# Patient Record
Sex: FEMALE | Race: AMERICAN INDIAN OR ALASKA NATIVE
[De-identification: names, ages, dates, MRNs, and addresses within clinical notes are randomized per-mention and may not be internally consistent; named-entity substitution may affect disease eponyms.]

---

## 2019-11-01 ENCOUNTER — HOSPITAL ENCOUNTER (EMERGENCY)
Dept: HOSPITAL 5 - ED | Age: 29
Discharge: HOME | End: 2019-11-01
Payer: COMMERCIAL

## 2019-11-01 VITALS — SYSTOLIC BLOOD PRESSURE: 133 MMHG | DIASTOLIC BLOOD PRESSURE: 63 MMHG

## 2019-11-01 DIAGNOSIS — K21.9: ICD-10-CM

## 2019-11-01 DIAGNOSIS — R00.2: ICD-10-CM

## 2019-11-01 DIAGNOSIS — B34.9: Primary | ICD-10-CM

## 2019-11-01 LAB
BASOPHILS # (AUTO): 0.1 K/MM3 (ref 0–0.1)
BASOPHILS NFR BLD AUTO: 1.2 % (ref 0–1.8)
BUN SERPL-MCNC: 9 MG/DL (ref 7–17)
BUN/CREAT SERPL: 15 %
CALCIUM SERPL-MCNC: 9.2 MG/DL (ref 8.4–10.2)
EOSINOPHIL # BLD AUTO: 0 K/MM3 (ref 0–0.4)
EOSINOPHIL NFR BLD AUTO: 0.2 % (ref 0–4.3)
HCT VFR BLD CALC: 40.8 % (ref 30.3–42.9)
HEMOLYSIS INDEX: 24
HGB BLD-MCNC: 13.9 GM/DL (ref 10.1–14.3)
LYMPHOCYTES # BLD AUTO: 1.5 K/MM3 (ref 1.2–5.4)
LYMPHOCYTES NFR BLD AUTO: 34.8 % (ref 13.4–35)
MCHC RBC AUTO-ENTMCNC: 34 % (ref 30–34)
MCV RBC AUTO: 89 FL (ref 79–97)
MONOCYTES # (AUTO): 0.2 K/MM3 (ref 0–0.8)
MONOCYTES % (AUTO): 4.3 % (ref 0–7.3)
PLATELET # BLD: 206 K/MM3 (ref 140–440)
RBC # BLD AUTO: 4.61 M/MM3 (ref 3.65–5.03)

## 2019-11-01 PROCEDURE — 93005 ELECTROCARDIOGRAM TRACING: CPT

## 2019-11-01 PROCEDURE — 36415 COLL VENOUS BLD VENIPUNCTURE: CPT

## 2019-11-01 PROCEDURE — 84443 ASSAY THYROID STIM HORMONE: CPT

## 2019-11-01 PROCEDURE — 93010 ELECTROCARDIOGRAM REPORT: CPT

## 2019-11-01 PROCEDURE — 71046 X-RAY EXAM CHEST 2 VIEWS: CPT

## 2019-11-01 PROCEDURE — 80048 BASIC METABOLIC PNL TOTAL CA: CPT

## 2019-11-01 PROCEDURE — 85025 COMPLETE CBC W/AUTO DIFF WBC: CPT

## 2019-11-01 NOTE — XRAY REPORT
CHEST 1 VIEW 11/1/2019 9:09 AM



INDICATION / CLINICAL INFORMATION:

palpitations.



COMPARISON: 

None available.



FINDINGS:



SUPPORT DEVICES: None.



HEART / MEDIASTINUM: No significant abnormality. 



LUNGS / PLEURA: No significant pulmonary or pleural abnormality. No pneumothorax. 



ADDITIONAL FINDINGS: No significant additional findings.



IMPRESSION:

1. No acute findings.



Signer Name: LASHAWN Bernstein MD 

Signed: 11/1/2019 10:02 AM

 Workstation Name: [id number]

## 2019-11-01 NOTE — EMERGENCY DEPARTMENT REPORT
ED Palpitations HPI





- General


Chief Complaint: Arrhythmia/Palpitations


Stated Complaint: HEART PALPITATIONS


Time Seen by Provider: 19 07:40


Source: patient


Mode of arrival: Ambulatory


Limitations: No Limitations





- History of Present Illness


Initial Comments: 





This is a 28-year-old -American female who presents to the emergency room

with palpitations for 3 days.  Patient states she noticed palpitations occur 

with movement.  She reports a history of GERD.  Patient states she has been 

experiencing some anxiety since her mom  in 2016.  Patient states she had 

anxiety under control and not sure if this is related to.  Patient states she 

read online that increase in fluid intake will improve palpitations.  She has 

increase fluid intake but continues to have palpitations.  She denies chest 

pain, nausea, vomiting, shortness of breath, fever, chills, cough, wheezing or 

dizziness.


MD Complaint: palpitations


Onset/Timing: 3


-: days(s)


Context: occured during exertion


Associated Symptoms: denies other symptoms





- Related Data


                                    Allergies











Allergy/AdvReac Type Severity Reaction Status Date / Time


 


No Known Allergies Allergy   Unverified 19 07:19














ED Review of Systems


ROS: 


Stated complaint: HEART PALPITATIONS


Other details as noted in HPI





Constitutional: denies: chills, fever


Respiratory: denies: cough, shortness of breath, wheezing


Cardiovascular: palpitations.  denies: chest pain, dyspnea on exertion, 

orthopnea, edema, syncope, paroxysmal nocturnal dyspnea


Endocrine: no symptoms reported


Gastrointestinal: denies: abdominal pain, nausea, diarrhea


Skin: denies: rash, lesions


Neurological: denies: headache, weakness, paresthesias


Psychiatric: denies: anxiety, depression





ED Past Medical Hx





- Past Medical History


Previous Medical History?: Yes


Hx GERD: Yes





- Surgical History


Past Surgical History?: No





- Social History


Smoking Status: Never Smoker


Substance Use Type: None





ED Physical Exam





- General


Limitations: No Limitations


General appearance: alert, in no apparent distress, obese





- ENT


ENT exam: Present: mucous membranes moist





- Respiratory


Respiratory exam: Present: normal lung sounds bilaterally.  Absent: respiratory 

distress, wheezes, rales, rhonchi, stridor, chest wall tenderness





- Cardiovascular


Cardiovascular Exam: Present: regular rate, normal rhythm.  Absent: bradycardia,

tachycardia, irregular rhythm, systolic murmur, diastolic murmur, rubs, gallop





- GI/Abdominal


GI/Abdominal exam: Present: soft, normal bowel sounds.  Absent: distended, 

tenderness, guarding, rebound, rigid





- Neurological Exam


Neurological exam: Present: alert, oriented X3, normal gait





- Psychiatric


Psychiatric exam: Present: normal affect, normal mood





- Skin


Skin exam: Present: warm, dry, intact, normal color.  Absent: rash





ED Course


                                   Vital Signs











  19





  07:16 07:34


 


Temperature 98.3 F 


 


Pulse Rate  77


 


Respiratory 16 18





Rate  


 


Blood Pressure 149/94 


 


O2 Sat by Pulse  100





Oximetry  














ED Medical Decision Making





- Lab Data


Result diagrams: 


                                 19 09:22





                                 19 09:22








                                   Lab Results











  19 Range/Units





  09:22 09:22 09:22 


 


WBC  4.3 L    (4.5-11.0)  K/mm3


 


RBC  4.61    (3.65-5.03)  M/mm3


 


Hgb  13.9    (10.1-14.3)  gm/dl


 


Hct  40.8    (30.3-42.9)  %


 


MCV  89    (79-97)  fl


 


MCH  30    (28-32)  pg


 


MCHC  34    (30-34)  %


 


RDW  13.3    (13.2-15.2)  %


 


Plt Count  206    (140-440)  K/mm3


 


Lymph % (Auto)  34.8    (13.4-35.0)  %


 


Mono % (Auto)  4.3    (0.0-7.3)  %


 


Eos % (Auto)  0.2    (0.0-4.3)  %


 


Baso % (Auto)  1.2    (0.0-1.8)  %


 


Lymph #  1.5    (1.2-5.4)  K/mm3


 


Mono #  0.2    (0.0-0.8)  K/mm3


 


Eos #  0.0    (0.0-0.4)  K/mm3


 


Baso #  0.1    (0.0-0.1)  K/mm3


 


Seg Neutrophils %  59.5    (40.0-70.0)  %


 


Seg Neutrophils #  2.5    (1.8-7.7)  K/mm3


 


Sodium   143   (137-145)  mmol/L


 


Potassium   3.7   (3.6-5.0)  mmol/L


 


Chloride   105.2   ()  mmol/L


 


Carbon Dioxide   26   (22-30)  mmol/L


 


Anion Gap   16   mmol/L


 


BUN   9   (7-17)  mg/dL


 


Creatinine   0.6 L   (0.7-1.2)  mg/dL


 


Estimated GFR   > 60   ml/min


 


BUN/Creatinine Ratio   15   %


 


Glucose   110 H   ()  mg/dL


 


Calcium   9.2   (8.4-10.2)  mg/dL


 


TSH    0.927  (0.270-4.200)  mlU/mL














- Radiology Data


Radiology results: report reviewed





CHEST 1 VIEW 2019 9:09 AM





INDICATION / CLINICAL INFORMATION:


palpitations.





COMPARISON:


None available.





FINDINGS:





SUPPORT DEVICES: None.





HEART / MEDIASTINUM: No significant abnormality.





LUNGS / PLEURA: No significant pulmonary or pleural abnormality. No 

pneumothorax.





ADDITIONAL FINDINGS: No significant additional findings.





IMPRESSION:


1. No acute findings.





- Medical Decision Making





Patient was examined by me.  Patient is nontoxic appearing and stable.  Vitals 

are normal.  Obtained labs, EKG, and a chest x-ray.  There are signs of viral 

infection which possibly could cause palpitations.  Patient denies shortness of 

breath, chest pain, cough, fever, chills, wheezing, or dizziness.  EKG shows 

sinus rhythm and chest x-ray without acute cardiopulmonary findings.   Past 

medical history of GERD.  Instructed to take OTC pepcid or nexium for trial.  

Patient informed of results. Patient instructed to follow-up with the primary c

are doctor or return to the emergency room with worsening symptoms.  Referral to

cardiology was provided.  Patient discharged home in stable condition. 


Critical care attestation.: 


If time is entered above; I have spent that time in minutes in the direct care 

of this critically ill patient, excluding procedure time.








ED Disposition


Clinical Impression: 


 Palpitations, Viral syndrome





Disposition: DC-01 TO HOME OR SELFCARE


Is pt being admited?: No


Condition: Stable


Instructions:  Palpitations (ED)


Additional Instructions: 


Follow-up with the primary care doctor or return to the emergency room with 

worsening symptoms.


Referrals: 


[facility] [Outside] - 3-5 Days


[facility] [Outside] - 3-5 Days


KERLINE SHELTON MD [Staff Physician] - 3-5 Days


Forms:  Work/School Release Form(ED)


Time of Disposition: 10:36

## 2019-11-17 ENCOUNTER — HOSPITAL ENCOUNTER (EMERGENCY)
Dept: HOSPITAL 5 - ED | Age: 29
Discharge: HOME | End: 2019-11-17
Payer: COMMERCIAL

## 2019-11-17 VITALS — SYSTOLIC BLOOD PRESSURE: 147 MMHG | DIASTOLIC BLOOD PRESSURE: 81 MMHG

## 2019-11-17 DIAGNOSIS — G44.209: Primary | ICD-10-CM

## 2019-11-17 DIAGNOSIS — K21.9: ICD-10-CM

## 2019-11-17 DIAGNOSIS — Z79.899: ICD-10-CM

## 2019-11-17 DIAGNOSIS — J01.00: ICD-10-CM

## 2019-11-17 PROCEDURE — 71046 X-RAY EXAM CHEST 2 VIEWS: CPT

## 2019-11-17 PROCEDURE — 99283 EMERGENCY DEPT VISIT LOW MDM: CPT

## 2019-11-17 NOTE — EMERGENCY DEPARTMENT REPORT
Blank Doc





- Documentation


Documentation: 





29-year-old female that presents with URI symptoms.





This initial assessment/diagnostic orders/clinical plan/treatment(s) is/are 

subject to change based on patient's health status, clinical progression and re-

assessment by fellow clinical providers in the ED.  Further treatment and workup

at subsequent clinical providers discretion.  Patient/guardians urged not to 

elope from the ED as their condition may be serious if not clinically assessed 

and managed.  Initial orders include:


1- Patient sent to [facility] for further evaluation and treatment


2- CXR

## 2019-11-17 NOTE — EMERGENCY DEPARTMENT REPORT
Minor Respiratory





- HPI


Chief Complaint: Upper Respiratory Infection


Stated Complaint: SOB/COUGH/SNEEZING/BODY ACHES


Time Seen by Provider: 11/17/19 11:48


Duration: 1 week


Pain Location: Nose


Severity: moderate


Minor Respiratory: Yes Rhinorrhea, Yes Able to Tolerate Fluids, Yes Cough, Yes 

Sick Contacts, No Sore Throat, No Ear Pain, No Hemoptysis, No Chest Pain, No 

Shortness of Breath, No Fever


Other History: This is a 29-year-old -American female who presents to the

emergency room with cough, body aches, rhinorrhea, and headache for one week.  

She has taken NyQuil for 1 day.  Past medical history of GERD.  Patient states 

she also takes omeprazole for concern of possible GERD flare with no improvement

of symptoms.  Denies sore throat, dysphagia, fever, chills, nausea, vomiting, or

abdominal pain.





ED Review of Systems


ROS: 


Stated complaint: SOB/COUGH/SNEEZING/BODY ACHES


Other details as noted in HPI





Constitutional: denies: chills, fever


ENT: congestion.  denies: ear pain, throat pain


Respiratory: cough.  denies: shortness of breath, wheezing


Cardiovascular: denies: chest pain, palpitations


Gastrointestinal: denies: abdominal pain, nausea, diarrhea


Musculoskeletal: myalgia.  denies: back pain, joint swelling, arthralgia


Skin: denies: rash, lesions


Neurological: denies: headache, weakness, paresthesias


Psychiatric: denies: anxiety, depression





ED Past Medical Hx





- Past Medical History


Previous Medical History?: Yes


Hx GERD: Yes





- Surgical History


Past Surgical History?: No





- Social History


Smoking Status: Never Smoker


Substance Use Type: None





- Medications


Home Medications: 


                                Home Medications











 Medication  Instructions  Recorded  Confirmed  Last Taken  Type


 


Benzonatate [Tessalon Perles] 100 mg PO Q8HR PRN #30 capsule 11/17/19  Unknown 

Rx


 


Fluticasone [Flonase] 1 spray NS QDAY #1 bottle 11/17/19  Unknown Rx


 


guaiFENesin ER [Mucinex ER] 600 mg PO Q12H #14 tablet.er 11/17/19  Unknown Rx














Minor Respiratory Exam





- Exam


General: 


Vital signs noted. No distress. Alert and acting appropriately.





HEENT: Yes Pharyngeal Erythema (erythematous posterior pharynx, uvula midline), 

Yes Moist Mucous Membranes, Yes Rhinorrhea (turbinates congested with mucoid 

discharge), Yes Maxillary Tenderness, No Pharyngeal Exudates, No Conjuctival 

Injection, No Frontal Tenderness


Ear: Neither TM Bulge, Neither TM Erythema, Neither EAC Pain, Neither EAC 

Discharge


Neck: Yes Supple, No Adenopathy


Lungs: Yes Good Air Exchange, No Wheezes, No Ronchi, No Stridor, No Cough, No 

Labored Respirations, No Retractions, No Use of Accessory Muscles, No Other 

Abnormal Lung Sounds


Heart: Yes Regular, No Murmur


Abdomen: Yes Normal Bowel Sounds, No Tenderness, No Peritoneal Signs


Skin: No Rash, No Edema


Neurologic: 


Alert and oriented, no deficits.








Musculoskeletal: 


Unremarkable.











ED Course


                                   Vital Signs











  11/17/19





  11:48


 


Temperature 98.3 F


 


Pulse Rate 78


 


Respiratory 18





Rate 


 


Blood Pressure 147/81


 


O2 Sat by Pulse 99





Oximetry 














ED Medical Decision Making





- Radiology Data


Radiology results: report reviewed





CHEST 2 VIEWS





INDICATION / CLINICAL INFORMATION:


cough.





COMPARISON:


11/1/2019.





FINDINGS:





SUPPORT DEVICES: None.





HEART / MEDIASTINUM: No significant abnormality.





LUNGS / PLEURA: No significant pulmonary or pleural abnormality. No 

pneumothorax.





ADDITIONAL FINDINGS: No significant additional findings.





IMPRESSION:


1. No acute findings. No significant change from the prior study.





- Medical Decision Making





29 y.o. female that presents with congestion,myalgia, rhinorrhea, and headache 

for 1 week. Patient examined by me and stable. No distress noted. Vitals normal.

Chest xray has been obtained with no acute cardiopulmonary findings.  There is 

moderate congestion and tenderness over maxillary sinuses.  Findings are 

susceptible of acute maxillary sinusitis.  Start Flonase, Tessalon Perles, and 

mucinex.  Discussed ER plan with patient who agreed.  Discharged home stable. 

Follow up with Primary Care Provider in 2-3 days.


Critical care attestation.: 


If time is entered above; I have spent that time in minutes in the direct care 

of this critically ill patient, excluding procedure time.








ED Disposition


Clinical Impression: 


 Cough in adult





Headache


Qualifiers:


 Headache type: tension-type Headache chronicity pattern: acute headache 

Intractability: not intractable Qualified Code(s): G44.209 - Tension-type 

headache, unspecified, not intractable





Sinusitis, acute, maxillary


Qualifiers:


 Recurrence: non-recurrent Qualified Code(s): J01.00 - Acute maxillary 

sinusitis, unspecified





Disposition: DC-01 TO HOME OR SELFCARE


Is pt being admited?: No


Condition: Stable


Instructions:  Sinusitis (ED), Acute Headache (ED)


Additional Instructions: 


Use warm moist compresses over sinuses.





Use ibuprofen or Tylenol for pain.





Use nasal saline spray.





Avoid taking antihistamines.





Follow up with Primary Care Provider if fever, short of breath, chest pain, or 

symptoms do not improve as discussed.


Prescriptions: 


Fluticasone [Flonase] 1 spray NS QDAY #1 bottle


guaiFENesin ER [Mucinex ER] 600 mg PO Q12H #14 tablet.er


Benzonatate [Tessalon Perles] 100 mg PO Q8HR PRN #30 capsule


 PRN Reason: Cough


Referrals: 


[facility] [Outside] - 3-5 Days


[facility] [Outside] - 3-5 Days


[facility] [Outside] - 3-5 Days


Forms:  Work/School Release Form(ED)


Time of Disposition: 13:11

## 2019-11-17 NOTE — XRAY REPORT
CHEST 2 VIEWS 



INDICATION / CLINICAL INFORMATION:

cough.



COMPARISON: 

11/1/2019.



FINDINGS:



SUPPORT DEVICES: None.



HEART / MEDIASTINUM: No significant abnormality. 



LUNGS / PLEURA: No significant pulmonary or pleural abnormality. No pneumothorax. 



ADDITIONAL FINDINGS: No significant additional findings.



IMPRESSION:

1. No acute findings. No significant change from the prior study.



Signer Name: Gabriel Lorenzo MD 

Signed: 11/17/2019 12:12 PM

 Workstation Name: [id number]

## 2019-12-11 ENCOUNTER — HOSPITAL ENCOUNTER (EMERGENCY)
Dept: HOSPITAL 5 - ED | Age: 29
Discharge: HOME | End: 2019-12-11
Payer: COMMERCIAL

## 2019-12-11 VITALS — DIASTOLIC BLOOD PRESSURE: 76 MMHG | SYSTOLIC BLOOD PRESSURE: 136 MMHG

## 2019-12-11 DIAGNOSIS — Z79.899: ICD-10-CM

## 2019-12-11 DIAGNOSIS — R00.2: Primary | ICD-10-CM

## 2019-12-11 DIAGNOSIS — K21.9: ICD-10-CM

## 2019-12-11 LAB
BASOPHILS # (AUTO): 0 K/MM3 (ref 0–0.1)
BASOPHILS NFR BLD AUTO: 0.3 % (ref 0–1.8)
BENZODIAZEPINES SCREEN,URINE: (no result)
BILIRUB UR QL STRIP: (no result)
BLOOD UR QL VISUAL: (no result)
BUN SERPL-MCNC: 8 MG/DL (ref 7–17)
BUN/CREAT SERPL: 13 %
CALCIUM SERPL-MCNC: 9.1 MG/DL (ref 8.4–10.2)
EOSINOPHIL # BLD AUTO: 0 K/MM3 (ref 0–0.4)
EOSINOPHIL NFR BLD AUTO: 0 % (ref 0–4.3)
HCT VFR BLD CALC: 39.2 % (ref 30.3–42.9)
HEMOLYSIS INDEX: 41
HGB BLD-MCNC: 13.2 GM/DL (ref 10.1–14.3)
LYMPHOCYTES # BLD AUTO: 1.1 K/MM3 (ref 1.2–5.4)
LYMPHOCYTES NFR BLD AUTO: 14 % (ref 13.4–35)
MCHC RBC AUTO-ENTMCNC: 34 % (ref 30–34)
MCV RBC AUTO: 89 FL (ref 79–97)
METHADONE SCREEN,URINE: (no result)
MONOCYTES # (AUTO): 0.2 K/MM3 (ref 0–0.8)
MONOCYTES % (AUTO): 2.9 % (ref 0–7.3)
OPIATE SCREEN,URINE: (no result)
PH UR STRIP: 7 [PH] (ref 5–7)
PLATELET # BLD: 240 K/MM3 (ref 140–440)
PROT UR STRIP-MCNC: (no result) MG/DL
RBC # BLD AUTO: 4.41 M/MM3 (ref 3.65–5.03)
RBC #/AREA URNS HPF: 1 /HPF (ref 0–6)
T4 FREE SERPL-MCNC: 1.11 NG/DL (ref 0.76–1.46)
UROBILINOGEN UR-MCNC: < 2 MG/DL (ref ?–2)
WBC #/AREA URNS HPF: < 1 /HPF (ref 0–6)

## 2019-12-11 PROCEDURE — 93010 ELECTROCARDIOGRAM REPORT: CPT

## 2019-12-11 PROCEDURE — 36415 COLL VENOUS BLD VENIPUNCTURE: CPT

## 2019-12-11 PROCEDURE — 80307 DRUG TEST PRSMV CHEM ANLYZR: CPT

## 2019-12-11 PROCEDURE — 84703 CHORIONIC GONADOTROPIN ASSAY: CPT

## 2019-12-11 PROCEDURE — 85025 COMPLETE CBC W/AUTO DIFF WBC: CPT

## 2019-12-11 PROCEDURE — 84443 ASSAY THYROID STIM HORMONE: CPT

## 2019-12-11 PROCEDURE — 83735 ASSAY OF MAGNESIUM: CPT

## 2019-12-11 PROCEDURE — 84439 ASSAY OF FREE THYROXINE: CPT

## 2019-12-11 PROCEDURE — 93005 ELECTROCARDIOGRAM TRACING: CPT

## 2019-12-11 PROCEDURE — 80048 BASIC METABOLIC PNL TOTAL CA: CPT

## 2019-12-11 PROCEDURE — 81001 URINALYSIS AUTO W/SCOPE: CPT

## 2019-12-11 NOTE — EVENT NOTE
ED Screening Note


Date of service: 12/11/19


Time: 19:49


ED Screening Note: 


29 yr  f presents with heart palpatations x yesterday


recent antibiotic use for uti





This initial assessment/diagnostic orders/clinical plan/treatment(s) is/are 

subject to change based on patients health status, clinical progression and re-

assessment by fellow clinical providers in the ED. Further treatment and workup 

at subsequent clinical providers discretion. Patient/guardian urged not to elope

from the ED as their condition may be serious if not clinically assessed and 

managed. 





Initial orders include: 


labs,

## 2019-12-11 NOTE — EMERGENCY DEPARTMENT REPORT
ED Palpitations HPI





- General


Chief Complaint: Arrhythmia/Palpitations


Stated Complaint: HEART PALPITATION


Time Seen by Provider: 12/11/19 21:05


Source: patient


Mode of arrival: Ambulatory


Limitations: No Limitations





- History of Present Illness


MD Complaint: rapid heart beat, "heart racing", "skipped beats", palpitations


-: Last night


Context: occured during rest


Associated Symptoms: denies other symptoms





- Related Data


                                  Previous Rx's











 Medication  Instructions  Recorded  Last Taken  Type


 


Benzonatate [Tessalon Perles] 100 mg PO Q8HR PRN #30 capsule 11/17/19 Unknown Rx


 


Fluticasone [Flonase] 1 spray NS QDAY #1 bottle 11/17/19 Unknown Rx


 


guaiFENesin ER [Mucinex ER] 600 mg PO Q12H #14 tablet.er 11/17/19 Unknown Rx











                                    Allergies











Allergy/AdvReac Type Severity Reaction Status Date / Time


 


No Known Allergies Allergy   Verified 12/11/19 19:32














ED Review of Systems


ROS: 


Stated complaint: HEART PALPITATION


Other details as noted in HPI





Comment: All other systems reviewed and negative


Constitutional: denies: chills, fever


Respiratory: denies: orthopnea, shortness of breath, SOB with exertion, SOB at 

rest


Cardiovascular: palpitations.  denies: chest pain


Gastrointestinal: denies: abdominal pain, nausea, vomiting, diarrhea, constipa

tion, hematemesis, melena, hematochezia


Musculoskeletal: denies: back pain





ED Past Medical Hx





- Past Medical History


Hx GERD: Yes





- Social History


Smoking Status: Never Smoker


Substance Use Type: None





- Medications


Home Medications: 


                                Home Medications











 Medication  Instructions  Recorded  Confirmed  Last Taken  Type


 


Benzonatate [Tessalon Perles] 100 mg PO Q8HR PRN #30 capsule 11/17/19  Unknown 

Rx


 


Fluticasone [Flonase] 1 spray NS QDAY #1 bottle 11/17/19  Unknown Rx


 


guaiFENesin ER [Mucinex ER] 600 mg PO Q12H #14 tablet.er 11/17/19  Unknown Rx














ED Physical Exam





- General


Limitations: No Limitations


General appearance: alert, in no apparent distress





- Head


Head exam: Present: atraumatic, normocephalic, normal inspection





- Eye


Eye exam: Present: normal appearance, PERRL





- ENT


ENT exam: Present: normal exam, normal orophraynx, mucous membranes moist





- Neck


Neck exam: Present: normal inspection, full ROM.  Absent: tenderness, 

meningismus, lymphadenopathy, thyromegaly





- Respiratory


Respiratory exam: Present: normal lung sounds bilaterally





- Cardiovascular


Cardiovascular Exam: Present: regular rate, normal rhythm, normal heart sounds





- GI/Abdominal


GI/Abdominal exam: Present: soft, normal bowel sounds.  Absent: distended, 

tenderness, guarding, rebound, rigid, organomegaly, mass, bruit, pulsatile mass,

hernia





- Extremities Exam


Extremities exam: Present: normal inspection, full ROM, normal capillary refill.

 Absent: tenderness, pedal edema, calf tenderness





- Back Exam


Back exam: Present: normal inspection, full ROM.  Absent: CVA tenderness (R), 

CVA tenderness (L), muscle spasm, paraspinal tenderness, vertebral tenderness





- Neurological Exam


Neurological exam: Present: alert, oriented X3, CN II-XII intact, normal gait, 

reflexes normal





- Psychiatric


Psychiatric exam: Present: normal mood





- Skin


Skin exam: Present: warm, intact, normal color





ED Course


                                   Vital Signs











  12/11/19 12/11/19





  19:36 21:23


 


Temperature 98.5 F 98.3 F


 


Pulse Rate 81 52 L


 


Respiratory 14 19





Rate  


 


Blood Pressure 141/81 


 


Blood Pressure  136/76





[right arm]  


 


O2 Sat by Pulse 100 100





Oximetry  














ED Medical Decision Making





- Lab Data


Result diagrams: 


                                 12/11/19 20:50





                                 12/11/19 20:50





- EKG Data


-: EKG Interpreted by Me


EKG shows normal: sinus rhythm


Rate: normal





- EKG Data


Interpretation: no acute changes





- Medical Decision Making





EKG is unremarkable.  Labs reviewed and is negative including thyroid panel.  

Patient advised to follow-up with her primary care physician in the next 2-3 

days and to return to the ER if symptoms have not improved.


Critical care attestation.: 


If time is entered above; I have spent that time in minutes in the direct care 

of this critically ill patient, excluding procedure time.








ED Disposition


Clinical Impression: 


 Palpitations





Disposition: DC-01 TO HOME OR SELFCARE


Is pt being admited?: No


Condition: Stable


Instructions:  Palpitations (ED)


Referrals: 


[facility] [Provider Group] - 3-5 Days


PRIMARY CARE,MD [Primary Care Provider] - 3-5 Days

## 2019-12-16 ENCOUNTER — HOSPITAL ENCOUNTER (EMERGENCY)
Dept: HOSPITAL 5 - ED | Age: 29
Discharge: HOME | End: 2019-12-16
Payer: COMMERCIAL

## 2019-12-16 VITALS — SYSTOLIC BLOOD PRESSURE: 142 MMHG | DIASTOLIC BLOOD PRESSURE: 90 MMHG

## 2019-12-16 DIAGNOSIS — F41.9: ICD-10-CM

## 2019-12-16 DIAGNOSIS — Z79.899: ICD-10-CM

## 2019-12-16 DIAGNOSIS — R07.89: ICD-10-CM

## 2019-12-16 DIAGNOSIS — K21.9: Primary | ICD-10-CM

## 2019-12-16 PROCEDURE — 93005 ELECTROCARDIOGRAM TRACING: CPT

## 2019-12-16 PROCEDURE — 93010 ELECTROCARDIOGRAM REPORT: CPT

## 2019-12-16 PROCEDURE — 71046 X-RAY EXAM CHEST 2 VIEWS: CPT

## 2019-12-16 NOTE — EMERGENCY DEPARTMENT REPORT
ED General Adult HPI





- General


Chief complaint: Chest Pain


Stated complaint: CHEST TIGHTNESS/LT SIDE NUMBNESS


Time Seen by Provider: 12/16/19 20:36


Source: patient


Mode of arrival: Ambulatory


Limitations: No Limitations





- History of Present Illness


Initial comments: 





Patient is a 29-year-old female presents to the emergency room with complaints 

of a panic attack that occurred today while she was picking up her prescriptions

at the pharmacy.  She states that she felt a tingling sensation in her left arm 

and felt some pressure/burning sensation in her left chest.  She denies any 

shortness of breath, pleuritic chest pain, leg swelling, any other symptoms.  

She states that she has a past medical history of anxiety and has these episodes

occasionally.  She states that she used to be on medication and attended therapy

but just moved here from [place] does not have a therapist or psychiatrist.  She 

denies any SI or HI.  she also has a hx of GERD.


Severity scale (0 -10): 0





- Related Data


                                  Previous Rx's











 Medication  Instructions  Recorded  Last Taken  Type


 


Benzonatate [Tessalon Perles] 100 mg PO Q8HR PRN #30 capsule 11/17/19 Unknown Rx


 


Fluticasone [Flonase] 1 spray NS QDAY #1 bottle 11/17/19 Unknown Rx


 


guaiFENesin ER [Mucinex ER] 600 mg PO Q12H #14 tablet.er 11/17/19 Unknown Rx











                                    Allergies











Allergy/AdvReac Type Severity Reaction Status Date / Time


 


No Known Allergies Allergy   Verified 12/11/19 19:32














ED Review of Systems


ROS: 


Stated complaint: CHEST TIGHTNESS/LT SIDE NUMBNESS


Other details as noted in HPI





Comment: All other systems reviewed and negative





ED Past Medical Hx





- Past Medical History


Previous Medical History?: Yes


Hx GERD: Yes





- Surgical History


Past Surgical History?: No





- Social History


Smoking Status: Never Smoker


Substance Use Type: None





- Medications


Home Medications: 


                                Home Medications











 Medication  Instructions  Recorded  Confirmed  Last Taken  Type


 


Benzonatate [Tessalon Perles] 100 mg PO Q8HR PRN #30 capsule 11/17/19  Unknown 

Rx


 


Fluticasone [Flonase] 1 spray NS QDAY #1 bottle 11/17/19  Unknown Rx


 


guaiFENesin ER [Mucinex ER] 600 mg PO Q12H #14 tablet.er 11/17/19  Unknown Rx














ED Physical Exam





- General


Limitations: No Limitations


General appearance: alert, in no apparent distress





- Head


Head exam: Present: atraumatic, normocephalic





- Eye


Eye exam: Present: normal appearance





- ENT


ENT exam: Present: mucous membranes moist





- Respiratory


Respiratory exam: Present: normal lung sounds bilaterally.  Absent: respiratory 

distress, wheezes, rales, rhonchi, stridor, chest wall tenderness, accessory 

muscle use, decreased breath sounds, prolonged expiratory





- Cardiovascular


Cardiovascular Exam: Present: regular rate, normal rhythm, normal heart sounds. 

Absent: systolic murmur, diastolic murmur, rubs, gallop





- Neurological Exam


Neurological exam: Present: alert, oriented X3





- Psychiatric


Psychiatric exam: Present: normal affect, normal mood





- Skin


Skin exam: Present: warm, dry, intact





ED Course


                                   Vital Signs











  12/16/19 12/16/19





  18:27 22:19


 


Temperature 98.2 F 


 


Pulse Rate 90 86


 


Respiratory 16 16





Rate  


 


Blood Pressure 163/101 142/90





[Right]  


 


O2 Sat by Pulse 98 100





Oximetry  














ED Medical Decision Making





- Radiology Data


Radiology results: report reviewed





CHEST PA AND LATERAL VIEWS





INDICATION:


Chest Pain.





COMPARISON:


11/17/2019





FINDINGS:





Support devices: None





Heart: Normal and unchanged





Lungs/Pleura: No acute pulmonary or pleural findings.








IMPRESSION:


1. No significant abnormality and no interval change.





Signer Name: Vishnu Vasquez MD


Signed: 12/16/2019 7:03 PM


Workstation Name: [id number]








Transcribed By: TM


Dictated By: Vishnu Vasquez MD


Electronically Authenticated By: Vishnu Vasquez MD


Signed Date/Time: 12/16/19 1373








- Medical Decision Making





Patient is a 29-year-old female presents to the emergency room with complaints 

of a panic attack that occurred today while she was picking up her prescriptions

at the pharmacy.  She states that she felt a tingling sensation in her left arm 

and felt some pressure/burning sensation in her left chest.  She denies any 

shortness of breath, pleuritic chest pain, leg swelling, any other symptoms.  

She states that she has a past medical history of anxiety and has these episodes

occasionally.  She states that she used to be on medication and attended therapy

but just moved here from [place] does not have a therapist or psychiatrist.  She 

denies any SI or HI.  she also has a hx of GERD.  Initial vitals with elevated 

blood pressure which improved upon repeat otherwise normal vitals.  Chest x-ray 

with no acute process.  Patient was evaluated in the emergency department last 

week and had stable lab work and normal EKG. patient given a GI cocktail for 

acid reflux which she said improved burning sensation.  PERC criteria negative. 

pt has very low risk for cardiac event. Will have patient follow-up with a 

cardiologist and a psychiatrist due to her anxiety. advised pt Please follow up 

with a cardiologist and a psychiatrist in the next 2-3 days.  Return to the [facility] room immediately for any new or worsening symptoms.





- Differential Diagnosis


anxiety, costochrondritis, GERD, PTX, PE, CHF


Critical care attestation.: 


If time is entered above; I have spent that time in minutes in the direct care 

of this critically ill patient, excluding procedure time.








ED Disposition


Clinical Impression: 


 Atypical chest pain, Anxiety





GERD (gastroesophageal reflux disease)


Qualifiers:


 Esophagitis presence: without esophagitis Qualified Code(s): K21.9 - Gastro-

esophageal reflux disease without esophagitis





Disposition: DC-01 TO HOME OR SELFCARE


Is pt being admited?: No


Does the pt Need Aspirin: No


Condition: Stable


Instructions:  Chest Pain (ED), Gastroesophageal Reflux in Children (ED), 

Anxiety (ED)


Additional Instructions: 


Please follow up with a cardiologist and a psychiatrist in the next 2-3 days.  

Return to the emergency room immediately for any new or worsening symptoms.


Referrals: 


[facility] [Outside] - 2-3 Days


MARYELLEN GRUBBS MD [Staff Physician] - 2-3 Days


Time of Disposition: 22:11


Print Language: ENGLISH

## 2019-12-16 NOTE — XRAY REPORT
CHEST PA AND LATERAL VIEWS



INDICATION: 

Chest Pain. 



COMPARISON: 

11/17/2019



FINDINGS:



Support devices: None 



Heart: Normal and unchanged 



Lungs/Pleura: No acute pulmonary or pleural findings.  





IMPRESSION:

1. No significant abnormality and no interval change.



Signer Name: Vishnu Vasquez MD 

Signed: 12/16/2019 7:03 PM

 Workstation Name: [id number]

## 2019-12-19 ENCOUNTER — HOSPITAL ENCOUNTER (EMERGENCY)
Dept: HOSPITAL 5 - ED | Age: 29
Discharge: HOME | End: 2019-12-19
Payer: COMMERCIAL

## 2019-12-19 VITALS — DIASTOLIC BLOOD PRESSURE: 99 MMHG | SYSTOLIC BLOOD PRESSURE: 143 MMHG

## 2019-12-19 DIAGNOSIS — K21.9: ICD-10-CM

## 2019-12-19 DIAGNOSIS — E87.6: ICD-10-CM

## 2019-12-19 DIAGNOSIS — R42: Primary | ICD-10-CM

## 2019-12-19 DIAGNOSIS — R51: ICD-10-CM

## 2019-12-19 LAB
ALBUMIN SERPL-MCNC: 4.4 G/DL (ref 3.9–5)
ALT SERPL-CCNC: 9 UNITS/L (ref 7–56)
BASOPHILS # (AUTO): 0.1 K/MM3 (ref 0–0.1)
BASOPHILS NFR BLD AUTO: 1.4 % (ref 0–1.8)
BILIRUB UR QL STRIP: (no result)
BLOOD UR QL VISUAL: (no result)
BUN SERPL-MCNC: 6 MG/DL (ref 7–17)
BUN/CREAT SERPL: 9 %
CALCIUM SERPL-MCNC: 9.5 MG/DL (ref 8.4–10.2)
EOSINOPHIL # BLD AUTO: 0 K/MM3 (ref 0–0.4)
EOSINOPHIL NFR BLD AUTO: 0.3 % (ref 0–4.3)
HCT VFR BLD CALC: 39.8 % (ref 30.3–42.9)
HEMOLYSIS INDEX: 8
HGB BLD-MCNC: 13.5 GM/DL (ref 10.1–14.3)
LYMPHOCYTES # BLD AUTO: 2.1 K/MM3 (ref 1.2–5.4)
LYMPHOCYTES NFR BLD AUTO: 34.2 % (ref 13.4–35)
MCHC RBC AUTO-ENTMCNC: 34 % (ref 30–34)
MCV RBC AUTO: 89 FL (ref 79–97)
MONOCYTES # (AUTO): 0.3 K/MM3 (ref 0–0.8)
MONOCYTES % (AUTO): 5.1 % (ref 0–7.3)
PH UR STRIP: 6 [PH] (ref 5–7)
PLATELET # BLD: 236 K/MM3 (ref 140–440)
PROT UR STRIP-MCNC: (no result) MG/DL
RBC # BLD AUTO: 4.48 M/MM3 (ref 3.65–5.03)
RBC #/AREA URNS HPF: 3 /HPF (ref 0–6)
UROBILINOGEN UR-MCNC: < 2 MG/DL (ref ?–2)
WBC #/AREA URNS HPF: < 1 /HPF (ref 0–6)

## 2019-12-19 PROCEDURE — 93010 ELECTROCARDIOGRAM REPORT: CPT

## 2019-12-19 PROCEDURE — 80053 COMPREHEN METABOLIC PANEL: CPT

## 2019-12-19 PROCEDURE — 81001 URINALYSIS AUTO W/SCOPE: CPT

## 2019-12-19 PROCEDURE — 36415 COLL VENOUS BLD VENIPUNCTURE: CPT

## 2019-12-19 PROCEDURE — 93005 ELECTROCARDIOGRAM TRACING: CPT

## 2019-12-19 PROCEDURE — 85025 COMPLETE CBC W/AUTO DIFF WBC: CPT

## 2019-12-19 NOTE — EMERGENCY DEPARTMENT REPORT
Blank Doc





- Documentation


Documentation: 





29-year-old female that presents with dizziness and left arm feeling heavy.





This initial assessment/diagnostic orders/clinical plan/treatment(s) is/are 

subject to change based on patient's health status, clinical progression and re-

assessment by fellow clinical providers in the ED.  Further treatment and workup

at subsequent clinical providers discretion.  Patient/guardians urged not to 

elope from the ED as their condition may be serious if not clinically assessed 

and managed.  Initial orders include:


1- Patient sent to [facility] for further evaluation and treatment


2- labs


3- EKG

## 2019-12-19 NOTE — EMERGENCY DEPARTMENT REPORT
ED General Adult HPI





- General


Chief complaint: Dizziness


Stated complaint: DIZZY/MIDDLE FACE PRESSURE


Time Seen by Provider: 12/19/19 15:17


Source: patient


Mode of arrival: Ambulatory


Limitations: No Limitations





- History of Present Illness


Initial comments: 





Patient is a 29-year-old female presents emergency room with complete symptom 

dizziness that began 3 days ago.  She states it's worse with laying flat or when

she first stands up.  She states she also has a frontal headache described as a 

pressure.  Patient states also for one month she has had her "whole body feel 

like the muscles are pulling."  She denies any chest pain, nausea, vomiting, s

hortness of breath, leg swelling, vision changes, lateral numbness or weakness. 

Patient states that she has an appointment with a cardiologist tomorrow.  That 

she went to her primary care physician office earlier this week.  She states 

that her only medical history is vitamin D deficiency and hypercholesterolemia. 

States that she was only placed on cholesterol medicine approximately 3 days ago

and states that she had the muscle pulling sensation for a month





- Related Data


                                  Previous Rx's











 Medication  Instructions  Recorded  Last Taken  Type


 


Benzonatate [Tessalon Perles] 100 mg PO Q8HR PRN #30 capsule 11/17/19 Unknown Rx


 


Fluticasone [Flonase] 1 spray NS QDAY #1 bottle 11/17/19 Unknown Rx


 


guaiFENesin ER [Mucinex ER] 600 mg PO Q12H #14 tablet.er 11/17/19 Unknown Rx


 


Cyclobenzaprine [Flexeril] 10 mg PO QHS PRN #10 tablet 12/19/19 Unknown Rx


 


Meclizine [Antivert] 25 mg PO TID PRN #10 tablet 12/19/19 Unknown Rx











                                    Allergies











Allergy/AdvReac Type Severity Reaction Status Date / Time


 


No Known Allergies Allergy   Verified 12/11/19 19:32














ED Review of Systems


ROS: 


Stated complaint: DIZZY/MIDDLE FACE PRESSURE


Other details as noted in HPI





Comment: All other systems reviewed and negative





ED Past Medical Hx





- Past Medical History


Previous Medical History?: Yes


Hx GERD: Yes





- Surgical History


Past Surgical History?: No





- Social History


Smoking Status: Never Smoker


Substance Use Type: None





- Medications


Home Medications: 


                                Home Medications











 Medication  Instructions  Recorded  Confirmed  Last Taken  Type


 


Benzonatate [Tessalon Perles] 100 mg PO Q8HR PRN #30 capsule 11/17/19  Unknown 

Rx


 


Fluticasone [Flonase] 1 spray NS QDAY #1 bottle 11/17/19  Unknown Rx


 


guaiFENesin ER [Mucinex ER] 600 mg PO Q12H #14 tablet.er 11/17/19  Unknown Rx


 


Cyclobenzaprine [Flexeril] 10 mg PO QHS PRN #10 tablet 12/19/19  Unknown Rx


 


Meclizine [Antivert] 25 mg PO TID PRN #10 tablet 12/19/19  Unknown Rx














ED Physical Exam





- General


Limitations: No Limitations


General appearance: alert, in no apparent distress





- Head


Head exam: Present: atraumatic, normocephalic





- Eye


Eye exam: Present: normal appearance, PERRL, EOMI.  Absent: scleral icterus, con

junctival injection, nystagmus, periorbital swelling, periorbital tenderness





- ENT


ENT exam: Present: normal orophraynx, mucous membranes moist, TM's normal bila

terally, normal external ear exam, other (normal turbinates, no purulence in the

 nares, no sinus TTP)





- Respiratory


Respiratory exam: Present: normal lung sounds bilaterally.  Absent: respiratory 

distress, wheezes, rales, rhonchi, stridor, chest wall tenderness, accessory 

muscle use, decreased breath sounds, prolonged expiratory





- Cardiovascular


Cardiovascular Exam: Present: regular rate, normal rhythm, normal heart sounds. 

 Absent: systolic murmur, diastolic murmur, rubs, gallop





- Neurological Exam


Neurological exam: Present: alert, oriented X3, CN II-XII intact, normal gait, 

other (normal finger to nose, normal heel to shin, 5/5 strength in the BUE/BLE, 

sensation intact throughout, no focal neuro deficit).  Absent: motor sensory 

deficit





- Psychiatric


Psychiatric exam: Present: normal affect, normal mood





- Skin


Skin exam: Present: warm, dry, intact





ED Course


                                   Vital Signs











  12/19/19 12/19/19





  15:06 21:03


 


Temperature 98.5 F 


 


Pulse Rate 87 77


 


Respiratory 16 17





Rate  


 


Blood Pressure 152/73 143/99





[Right]  


 


O2 Sat by Pulse 100 99





Oximetry  














ED Medical Decision Making





- Lab Data


Result diagrams: 


                                 12/19/19 15:27





                                 12/19/19 15:27








                                   Lab Results











  12/19/19 12/19/19 12/19/19 Range/Units





  15:27 15:27 20:07 


 


WBC  6.3    (4.5-11.0)  K/mm3


 


RBC  4.48    (3.65-5.03)  M/mm3


 


Hgb  13.5    (10.1-14.3)  gm/dl


 


Hct  39.8    (30.3-42.9)  %


 


MCV  89    (79-97)  fl


 


MCH  30    (28-32)  pg


 


MCHC  34    (30-34)  %


 


RDW  13.3    (13.2-15.2)  %


 


Plt Count  236    (140-440)  K/mm3


 


Lymph % (Auto)  34.2    (13.4-35.0)  %


 


Mono % (Auto)  5.1    (0.0-7.3)  %


 


Eos % (Auto)  0.3    (0.0-4.3)  %


 


Baso % (Auto)  1.4    (0.0-1.8)  %


 


Lymph #  2.1    (1.2-5.4)  K/mm3


 


Mono #  0.3    (0.0-0.8)  K/mm3


 


Eos #  0.0    (0.0-0.4)  K/mm3


 


Baso #  0.1    (0.0-0.1)  K/mm3


 


Seg Neutrophils %  59.0    (40.0-70.0)  %


 


Seg Neutrophils #  3.7    (1.8-7.7)  K/mm3


 


Sodium   140   (137-145)  mmol/L


 


Potassium   3.4 L   (3.6-5.0)  mmol/L


 


Chloride   102.2   ()  mmol/L


 


Carbon Dioxide   24   (22-30)  mmol/L


 


Anion Gap   17   mmol/L


 


BUN   6 L   (7-17)  mg/dL


 


Creatinine   0.7   (0.7-1.2)  mg/dL


 


Estimated GFR   > 60   ml/min


 


BUN/Creatinine Ratio   9   %


 


Glucose   101 H   ()  mg/dL


 


Calcium   9.5   (8.4-10.2)  mg/dL


 


Total Bilirubin   0.90   (0.1-1.2)  mg/dL


 


AST   13   (5-40)  units/L


 


ALT   9   (7-56)  units/L


 


Alkaline Phosphatase   66   ()  units/L


 


Total Protein   7.3   (6.3-8.2)  g/dL


 


Albumin   4.4   (3.9-5)  g/dL


 


Albumin/Globulin Ratio   1.5   %


 


Urine Color    Straw  (Yellow)  


 


Urine Turbidity    Clear  (Clear)  


 


Urine pH    6.0  (5.0-7.0)  


 


Ur Specific Gravity    1.003  (1.003-1.030)  


 


Urine Protein    <15 mg/dl  (Negative)  mg/dL


 


Urine Glucose (UA)    Neg  (Negative)  mg/dL


 


Urine Ketones    Tr  (Negative)  mg/dL


 


Urine Blood    Sm  (Negative)  


 


Urine Nitrite    Neg  (Negative)  


 


Urine Bilirubin    Neg  (Negative)  


 


Urine Urobilinogen    < 2.0  (<2.0)  mg/dL


 


Ur Leukocyte Esterase    Neg  (Negative)  


 


Urine WBC (Auto)    < 1.0  (0.0-6.0)  /HPF


 


Urine RBC (Auto)    3.0  (0.0-6.0)  /HPF


 


U Epithel Cells (Auto)    8.0  (0-13.0)  /HPF














- EKG Data


EKG shows normal: axis, intervals, QRS complexes


Rate: normal





- EKG Data





12/19/19 20:53


sinus arrhythmia 


LAE


non specific T wave changes V1,V2, V3


compared to previous EKGs only change is sinus arrhythmia otherwise unchanged





- Medical Decision Making





Patient is a 29-year-old female presents emergency room with complete symptom 

dizziness that began 3 days ago.  She states it's worse with laying flat or when

 she first stands up.  She states she also has a frontal headache described as a

 pressure.  Patient states also for one month she has had her "whole body feel 

like the muscles are pulling."  She denies any chest pain, nausea, vomiting, 

shortness of breath, leg swelling, vision changes, lateral numbness or weakness.

  Patient states that she has an appointment with a cardiologist tomorrow.  That

 she went to her primary care physician office earlier this week.  She states 

that her only medical history is vitamin D deficiency and hypercholesterolemia. 

 States that she was only placed on cholesterol medicine approximately 3 days 

ago and states that she had the muscle pulling sensation for a month.  Vitals 

are stable.  No abnormality on physical examination as recorded in chart.  Mild 

hypokalemia otherwise normal.  Patient given K-Dur.  UA is within normal limits.

  EKG with sinus arrhythmia, left atrial enlargement, nonspecific T-wave 

changes, appears similar to previous except for the sinus arrhythmia.  pt given 

meclizine while in the emergency department and had no further episodes of 

dizziness and was able to walk in the emergency department without sprinting 

dizziness.  She given prescription for meclizine and given prescription for 

Flexeril for her muscle spasms. her kidney function is normal, no protein in her

 urine, very unlikely rhabdomyolysis. advised pt Please take medication as 

prescribed.  Do not drive or operate machinery while taking muscle relaxer.  

Please follow-up with your primary care doctor and your cardiologist.  Increase 

your water intake over the next several days.  Return to the emergency room for 

any new or worsening symptoms.  Please discuss with your primary care doctor 

about the medications your taking.





- Differential Diagnosis


electrolyte disturbance, arrhythmia, vertigo, hypotension, UTI


Critical care attestation.: 


If time is entered above; I have spent that time in minutes in the direct care 

of this critically ill patient, excluding procedure time.








ED Disposition


Clinical Impression: 


 Dizziness, Hypokalemia





Disposition: DC-01 TO HOME OR SELFCARE


Is pt being admited?: No


Does the pt Need Aspirin: No


Condition: Stable


Instructions:  Hypokalemia (ED), Dizziness (ED)


Additional Instructions: 


Please take medication as prescribed.  Do not drive or operate machinery while 

taking muscle relaxer.  Please follow-up with your primary care doctor and your 

cardiologist.  Increase your water intake over the next several days.  Return to

 the emergency room for any new or worsening symptoms.  Please discuss with your

 primary care doctor about the medications your taking.


Prescriptions: 


Cyclobenzaprine [Flexeril] 10 mg PO QHS PRN #10 tablet


 PRN Reason: Muscle Spasm


Meclizine [Antivert] 25 mg PO TID PRN #10 tablet


 PRN Reason: Vertigo


Referrals: 


CLARISSA REYES MD [Primary Care Provider] - 2-3 Days


your, cardiologist [Other] - 2-3 Days


Time of Disposition: 20:58


Print Language: ENGLISH

## 2020-01-04 ENCOUNTER — HOSPITAL ENCOUNTER (EMERGENCY)
Dept: HOSPITAL 5 - ED | Age: 30
Discharge: HOME | End: 2020-01-04
Payer: COMMERCIAL

## 2020-01-04 VITALS — SYSTOLIC BLOOD PRESSURE: 125 MMHG | DIASTOLIC BLOOD PRESSURE: 92 MMHG

## 2020-01-04 DIAGNOSIS — Z79.899: ICD-10-CM

## 2020-01-04 DIAGNOSIS — K21.9: Primary | ICD-10-CM

## 2020-01-04 DIAGNOSIS — E78.00: ICD-10-CM

## 2020-01-04 DIAGNOSIS — R07.89: ICD-10-CM

## 2020-01-04 LAB
ALBUMIN SERPL-MCNC: 4.4 G/DL (ref 3.9–5)
ALT SERPL-CCNC: 11 UNITS/L (ref 7–56)
BASOPHILS # (AUTO): 0 K/MM3 (ref 0–0.1)
BASOPHILS NFR BLD AUTO: 0.8 % (ref 0–1.8)
BUN SERPL-MCNC: 5 MG/DL (ref 7–17)
BUN/CREAT SERPL: 7 %
CALCIUM SERPL-MCNC: 9.8 MG/DL (ref 8.4–10.2)
EOSINOPHIL # BLD AUTO: 0 K/MM3 (ref 0–0.4)
EOSINOPHIL NFR BLD AUTO: 0.1 % (ref 0–4.3)
HCT VFR BLD CALC: 42.2 % (ref 30.3–42.9)
HEMOLYSIS INDEX: 16
HGB BLD-MCNC: 14.1 GM/DL (ref 10.1–14.3)
LYMPHOCYTES # BLD AUTO: 1.6 K/MM3 (ref 1.2–5.4)
LYMPHOCYTES NFR BLD AUTO: 34.6 % (ref 13.4–35)
MCHC RBC AUTO-ENTMCNC: 33 % (ref 30–34)
MCV RBC AUTO: 90 FL (ref 79–97)
MONOCYTES # (AUTO): 0.3 K/MM3 (ref 0–0.8)
MONOCYTES % (AUTO): 5.8 % (ref 0–7.3)
PLATELET # BLD: 209 K/MM3 (ref 140–440)
RBC # BLD AUTO: 4.71 M/MM3 (ref 3.65–5.03)

## 2020-01-04 PROCEDURE — 36415 COLL VENOUS BLD VENIPUNCTURE: CPT

## 2020-01-04 PROCEDURE — 80053 COMPREHEN METABOLIC PANEL: CPT

## 2020-01-04 PROCEDURE — 93005 ELECTROCARDIOGRAM TRACING: CPT

## 2020-01-04 PROCEDURE — 71046 X-RAY EXAM CHEST 2 VIEWS: CPT

## 2020-01-04 PROCEDURE — 85025 COMPLETE CBC W/AUTO DIFF WBC: CPT

## 2020-01-04 PROCEDURE — 93010 ELECTROCARDIOGRAM REPORT: CPT

## 2020-01-04 PROCEDURE — 84484 ASSAY OF TROPONIN QUANT: CPT

## 2020-01-04 NOTE — EMERGENCY DEPARTMENT REPORT
ED Chest Pain HPI





- General


Chief Complaint: Chest Pain


Stated Complaint: CHEST PAIN


Time Seen by Provider: 01/04/20 14:57


Source: patient


Mode of arrival: Ambulatory


Limitations: No Limitations





- History of Present Illness


Initial Comments: 





Patient is a 29-year-old female presents emergency room with complaints of chest

pain that began this morning.  She states that it feels like a burning and 

pressure sensation.  She states that she took her omeprazole and an 

over-the-counter antacid without complete relief.  She denies any vomiting, 

shortness of breath, leg swelling, diaphoresis.  She denies any recent travel, 

recent surgery, hormone use.  Patient has been evaluated in the emergency 

department on multiple occasions for her chest pain.  Patient states that she 

recently saw a cardiologist and wore a Holter monitor and had an echo performed 

which were both normal.  Patient states she has also seen a GI doctor and had an

EGD performed and she is awaiting results. 





- Related Data


                                  Previous Rx's











 Medication  Instructions  Recorded  Last Taken  Type


 


Benzonatate [Tessalon Perles] 100 mg PO Q8HR PRN #30 capsule 11/17/19 Unknown Rx


 


Fluticasone [Flonase] 1 spray NS QDAY #1 bottle 11/17/19 Unknown Rx


 


guaiFENesin ER [Mucinex ER] 600 mg PO Q12H #14 tablet.er 11/17/19 Unknown Rx


 


Cyclobenzaprine [Flexeril] 10 mg PO QHS PRN #10 tablet 12/19/19 Unknown Rx


 


Meclizine [Antivert] 25 mg PO TID PRN #10 tablet 12/19/19 Unknown Rx


 


Pantoprazole [Protonix TAB] 40 mg PO QDAY #30 tablet 01/04/20 Unknown Rx











                                    Allergies











Allergy/AdvReac Type Severity Reaction Status Date / Time


 


No Known Allergies Allergy   Verified 12/11/19 19:32














Heart Score





- HEART Score


History: Slightly suspicious


EKG: Normal


Age: < 45


Risk factors: 1-2 risk factors


Troponin: < normal limit


HEART Score: 1





ED Review of Systems


ROS: 


Stated complaint: CHEST PAIN


Other details as noted in HPI





Comment: All other systems reviewed and negative





ED Past Medical Hx





- Past Medical History


Previous Medical History?: Yes


Hx GERD: Yes


Additional medical history: High cholesterol





- Surgical History


Past Surgical History?: No





- Social History


Smoking Status: Never Smoker


Substance Use Type: None





- Medications


Home Medications: 


                                Home Medications











 Medication  Instructions  Recorded  Confirmed  Last Taken  Type


 


Benzonatate [Tessalon Perles] 100 mg PO Q8HR PRN #30 capsule 11/17/19  Unknown 

Rx


 


Fluticasone [Flonase] 1 spray NS QDAY #1 bottle 11/17/19  Unknown Rx


 


guaiFENesin ER [Mucinex ER] 600 mg PO Q12H #14 tablet.er 11/17/19  Unknown Rx


 


Cyclobenzaprine [Flexeril] 10 mg PO QHS PRN #10 tablet 12/19/19  Unknown Rx


 


Meclizine [Antivert] 25 mg PO TID PRN #10 tablet 12/19/19  Unknown Rx


 


Pantoprazole [Protonix TAB] 40 mg PO QDAY #30 tablet 01/04/20  Unknown Rx














ED Physical Exam





- General


Limitations: No Limitations


General appearance: alert, in no apparent distress





- Head


Head exam: Present: atraumatic, normocephalic





- Eye


Eye exam: Present: normal appearance





- ENT


ENT exam: Present: mucous membranes moist





- Respiratory


Respiratory exam: Present: normal lung sounds bilaterally.  Absent: respiratory 

distress, wheezes, rales, rhonchi, stridor, chest wall tenderness, accessory 

muscle use, decreased breath sounds, prolonged expiratory





- Cardiovascular


Cardiovascular Exam: Present: regular rate, normal rhythm, normal heart sounds. 

Absent: systolic murmur, diastolic murmur, rubs, gallop





- Extremities Exam


Extremities exam: Absent: pedal edema





- Neurological Exam


Neurological exam: Present: alert, oriented X3





- Psychiatric


Psychiatric exam: Present: normal affect, normal mood





- Skin


Skin exam: Present: warm, dry, intact





ED Course


                                   Vital Signs











  01/04/20 01/04/20





  14:57 19:52


 


Temperature 98.3 F 


 


Pulse Rate 95 H 60


 


Respiratory 16 





Rate  


 


Blood Pressure 125/92 


 


O2 Sat by Pulse 100 100





Oximetry  














RAFFI score





- Raffi Score


Age > 65: (0) No


Aspirin use within the Past 7 Days: (0) No


3 or more CAD Risk Factors: (0) No


2 or more Angina events in past 24 hrs: (0) No


Known CAD with more than 50% Stenosis: (0) No


Elevated Cardiac Markers: (0) No


ST Deviation Greater than 0.5mm: (0) No


RAFFI Score: 0





ED Medical Decision Making





- Lab Data


Result diagrams: 


                                 01/04/20 15:44





                                 01/04/20 15:44





- EKG Data


EKG shows normal: sinus rhythm, axis, intervals, QRS complexes


Rate: normal





- EKG Data





01/04/20 19:27


non specific T wave inversion in V1, V2, V3


no STEMI





- Radiology Data


Radiology results: report reviewed





CHEST 2 VIEWS 





 INDICATION / CLINICAL INFORMATION: 


 chest pain. 





 COMPARISON: 


 Chest radiograph 12/16/2019 





 FINDINGS: 





 Normal heart size. Clear lungs. No acute abnormality or significant change from

12/16/2019. 





 Signer Name: Brian Harris MD 


 Signed: 1/4/2020 3:52 PM 


 Workstation Name: [id number] 








 Transcribed By: ROLANDO 


 Dictated By: Brian Harris MD 


 Electronically Authenticated By: Brian Harris MD 


 Signed Date/Time: 01/04/20 1552 











 DD/DT: 01/04/20 1551 


 TD/TT: 





- Medical Decision Making





Patient is a 29-year-old female presents emergency room with complaints of chest

pain that began this morning.  She states that it feels like a burning and press

ure sensation.  She states that she took her omeprazole and an over-the-counter 

antacid without complete relief.  She denies any vomiting, shortness of breath, 

leg swelling, diaphoresis.  She denies any recent travel, recent surgery, 

hormone use.  Patient has been evaluated in the emergency department on multiple

occasions for her chest pain.  Patient states that she recently saw a 

cardiologist and wore a Holter monitor and had an echo performed which were both

normal.  Patient states she has also seen a GI doctor and had an EGD performed 

and she is awaiting results. heart score is 1, raffi score is 0. PERC criteria 

negative for PE. labs are normal. troponin is negative x2. CXR:  Normal heart 

size. Clear lungs. No acute abnormality or significant change from 12/16/2019. 

pt given GI cocktail with some improvement in symptoms. it appears pt may have 

anxiety vs somatic symptom disorder will refer pt to mental health. pt given 

prescription for protonix. advised pt to please take medication as prescribed.  

Please stop taking omeprazole and begin taking pantoprazole.  Increase your 

water intake.  Please follow the diet for acid reflux.  Follow up with her 

primary care doctor in the next 2-3 days. please follow up with mental health.  

Return to the emergency room for any new or worsening symptoms.


Critical care attestation.: 


If time is entered above; I have spent that time in minutes in the direct care 

of this critically ill patient, excluding procedure time.








ED Disposition


Clinical Impression: 


 Atypical chest pain





GERD (gastroesophageal reflux disease)


Qualifiers:


 Esophagitis presence: without esophagitis Qualified Code(s): K21.9 - Gastro-

esophageal reflux disease without esophagitis





Disposition: DC-01 TO HOME OR SELFCARE


Is pt being admited?: No


Does the pt Need Aspirin: No


Condition: Stable


Instructions:  Chest Pain (ED), Diet for Ulcers and Gastritis (ED), 

Gastroesophageal Reflux Disease (ED)


Additional Instructions: 


please take medication as prescribed.  Please stop taking omeprazole and begin 

taking pantoprazole.  Increase your water intake.  Please follow the diet for 

acid reflux.  Follow up with her primary care doctor in the next 2-3 days. 

please follow up with mental health.  Return to the emergency room for any new 

or worsening symptoms.


Prescriptions: 


Pantoprazole [Protonix TAB] 40 mg PO QDAY #30 tablet


Referrals: 


PRIMARY CARE,MD [Primary Care Provider] - 2-3 Days


[facility] Days


Time of Disposition: 19:25


Print Language: ENGLISH

## 2020-01-04 NOTE — EVENT NOTE
ED Screening Note


Date of service: 01/04/20


Time: 14:58


ED Screening Note: 





Pt complains of substernal chest pain x 10 am


states hx of GERD and pain felt similar to her GERD pain, however omeprazole is 

not helping


follows with a GI specialist 





This initial assessment/diagnostic orders/clinical plan/treatment(s) is/are 

subject to change based on patients health status, clinical progression and re-

assessment by fellow clinical providers in the ED. Further treatment and workup 

at subsequent clinical providers discretion. Patient/guardian urged not to elope

from the ED as their condition may be serious if not clinically assessed and 

managed. 





Initial orders include: 


labs


CXR

## 2020-01-04 NOTE — XRAY REPORT
CHEST 2 VIEWS 



INDICATION / CLINICAL INFORMATION:

chest pain.



COMPARISON: 

Chest radiograph 12/16/2019



FINDINGS:



Normal heart size. Clear lungs. No acute abnormality or significant change from 12/16/2019.



Signer Name: Brian Harris MD 

Signed: 1/4/2020 3:52 PM

 Workstation Name: [id number] Class II - visualization of the soft palate, fauces, and uvula

## 2020-04-25 ENCOUNTER — HOSPITAL ENCOUNTER (EMERGENCY)
Dept: HOSPITAL 5 - ED | Age: 30
Discharge: HOME | End: 2020-04-25
Payer: COMMERCIAL

## 2020-04-25 VITALS — DIASTOLIC BLOOD PRESSURE: 63 MMHG | SYSTOLIC BLOOD PRESSURE: 104 MMHG

## 2020-04-25 DIAGNOSIS — Z79.899: ICD-10-CM
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
## 2020-04-25 DIAGNOSIS — E78.00: ICD-10-CM

## 2020-04-25 DIAGNOSIS — S43.084A: Primary | ICD-10-CM

## 2020-04-25 DIAGNOSIS — K21.9: ICD-10-CM
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
## 2020-04-25 DIAGNOSIS — Z79.1: ICD-10-CM
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
## 2020-04-25 PROCEDURE — 96374 THER/PROPH/DIAG INJ IV PUSH: CPT

## 2020-04-25 PROCEDURE — 96375 TX/PRO/DX INJ NEW DRUG ADDON: CPT

## 2020-04-25 PROCEDURE — 23650 CLTX SHO DSLC W/MNPJ WO ANES: CPT

## 2020-04-25 PROCEDURE — 99284 EMERGENCY DEPT VISIT MOD MDM: CPT

## 2020-04-25 PROCEDURE — 73020 X-RAY EXAM OF SHOULDER: CPT

## 2020-04-25 PROCEDURE — 73030 X-RAY EXAM OF SHOULDER: CPT

## 2020-04-25 NOTE — XRAY REPORT
RIGHT SHOULDER 1 VIEW 0359



INDICATION: Post reduction



COMPARISON: 0317



FINDINGS: On this single view the previous dislocation appears to have been reduced though there is s
till mild inferior subluxation. No fracture is identified.







Signer Name: Tashi Phan MD 

Signed: 4/25/2020 4:28 AM

Workstation Name: [id number]
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
## 2020-04-25 NOTE — XRAY REPORT
RIGHT SHOULDER 3 VIEWS 0317 



INDICATION: dislocated



COMPARISON: None available.



FINDINGS: An anterior and inferior dislocation of the humerus is seen relative to the glenoid. No def
inite fracture is identified.







Signer Name: Tashi Phan MD 

Signed: 4/25/2020 3:58 AM

Workstation Name: [id number]

## 2020-08-14 ENCOUNTER — OFFICE VISIT (OUTPATIENT)
Dept: URBAN - METROPOLITAN AREA CLINIC 109 | Facility: CLINIC | Age: 30
End: 2020-08-14

## 2020-08-14 RX ORDER — BISMUTH SUBSALICYLATE 525 MG/30ML
LIQUID ORAL
Qty: 0 | Refills: 0 | Status: ACTIVE | COMMUNITY
Start: 1900-01-01 | End: 1900-01-01

## 2020-09-02 ENCOUNTER — HOSPITAL ENCOUNTER (EMERGENCY)
Dept: HOSPITAL 5 - ED | Age: 30
LOS: 1 days | Discharge: HOME | End: 2020-09-03
Payer: COMMERCIAL

## 2020-09-02 DIAGNOSIS — J06.9: Primary | ICD-10-CM

## 2020-09-02 DIAGNOSIS — J02.9: ICD-10-CM

## 2020-09-02 DIAGNOSIS — J01.10: ICD-10-CM

## 2020-09-02 PROCEDURE — 71045 X-RAY EXAM CHEST 1 VIEW: CPT

## 2020-09-02 PROCEDURE — 99283 EMERGENCY DEPT VISIT LOW MDM: CPT

## 2020-09-02 PROCEDURE — 93005 ELECTROCARDIOGRAM TRACING: CPT

## 2020-09-03 VITALS — DIASTOLIC BLOOD PRESSURE: 86 MMHG | SYSTOLIC BLOOD PRESSURE: 142 MMHG

## 2020-09-03 NOTE — EMERGENCY DEPARTMENT REPORT
- General


Chief Complaint: Chest Pain


Stated Complaint: CHEST PAIN/HEADACHE/DIZZINESS X3WKS


Source: patient


Mode of arrival: Ambulatory


Limitations: No Limitations





- History of Present Illness


Initial Comments: 





Patient is a 29-year-old -American female with past medical history of 

GERD who presents to the ED with complaint of acute onset persistent frontal 

sinus pressure and headache, nasal and sinus congestion, sore throat, mild dry 

cough and pleuritic chest pain for the last 1 week, worse in the last 2 days.  

Patient states that she has been taking over-the-counter medications with no 

relief.  Patient also states that she lost her voice about 2 days ago due to 

persistent sore throat.  Patient denies fever, chills, nausea, vomiting, 

shortness of breath, abdominal pain, dizziness, syncope, neck pain, change in 

vision, dysuria, urinary frequency and urgency or hearing loss.


MD Complaint: cough, sore throat, rhinorrhea, nasal congestion, sinus pain, 

other (pleuritic chest pain)


-: Sudden, week(s) (1)


Severity: severe


Severity scale (0 -10): 8


Quality: burning, sharp


Consistency: constant


Improves With: OTC cold medicine, cough suppressant


Worsens With: nothing


Associated Symptoms: denies other symptoms, headache, rhinorrhea, nasal 

congestion, sore throat, cough, chest pain.  denies: fever, chills, myalgias, 

diaphoresis, stiff neck, shortness of breath, abdominal pain, nausea, vomiting, 

diarrhea, dysuria, rash, confusion, weight loss, epistaxis, hoarseness, ear pain


Treatments Prior to Arrival: "cold medicine"





- Related Data


                                  Previous Rx's











 Medication  Instructions  Recorded  Last Taken  Type


 


Benzonatate [Tessalon Perles] 100 mg PO Q8HR PRN #30 capsule 19 Unknown Rx


 


Fluticasone [Flonase] 1 spray NS QDAY #1 bottle 19 Unknown Rx


 


guaiFENesin ER [Mucinex ER] 600 mg PO Q12H #14 tablet.er 19 Unknown Rx


 


Cyclobenzaprine [Flexeril] 10 mg PO QHS PRN #10 tablet 19 Unknown Rx


 


Meclizine [Antivert] 25 mg PO TID PRN #10 tablet 19 Unknown Rx


 


Pantoprazole [Protonix TAB] 40 mg PO QDAY #30 tablet 20 Unknown Rx


 


Ibuprofen [Motrin] 800 mg PO Q8HR PRN #30 tablet 20 Unknown Rx


 


traMADoL [Ultram 50 MG tab] 50 mg PO Q4HR PRN #14 tablet 20 Unknown Rx


 


Cyclobenzaprine [Flexeril] 10 mg PO TID PRN #10 tablet 20 Unknown Rx


 


HYDROcodone/APAP 5-325 [[place] 1 each PO Q6HR PRN #14 tablet 20 Unknown Rx





5/325]    


 


Ibuprofen [Motrin 800 MG tab] 800 mg PO Q8HR PRN #20 tablet 20 Unknown Rx


 


Azithromycin [Zithromax Z-ENRIQUETA] 250 mg PO DAILY #6 tablet 20 Unknown Rx


 


Cetirizine HCl [Zyrtec 10mg tab] 10 mg PO DAILY #30 tablet 20 Unknown Rx


 


Ibuprofen [Motrin] 800 mg PO Q8HR PRN #24 tablet 20 Unknown Rx


 


methylPREDNISolone [Medrol 4MG 4 mg PO DAILY #21 tab.ds.pk 20 Unknown Rx





DOSEPAK (21 tabs)]    











                                    Allergies











Allergy/AdvReac Type Severity Reaction Status Date / Time


 


No Known Allergies Allergy   Verified 19 19:32














ED Review of Systems


ROS: 


Stated complaint: CHEST PAIN/HEADACHE/DIZZINESS X3WKS


Other details as noted in HPI





Constitutional: denies: chills, fever


Eyes: denies: eye pain, eye discharge, vision change


ENT: throat pain, congestion, other (Frontal sinus pressure and pain).  denies: 

ear pain


Respiratory: cough.  denies: shortness of breath, wheezing


Cardiovascular: chest pain (Pleuritic chest pain).  denies: palpitations


Endocrine: no symptoms reported


Gastrointestinal: denies: abdominal pain, nausea, diarrhea


Genitourinary: denies: urgency, dysuria, discharge


Musculoskeletal: denies: back pain, joint swelling, arthralgia


Skin: denies: rash, lesions


Neurological: headache.  denies: weakness, paresthesias


Psychiatric: denies: anxiety, depression


Hematological/Lymphatic: denies: easy bleeding, easy bruising





ED Past Medical Hx





- Past Medical History


Previous Medical History?: Yes


Hx GERD: Yes


Additional medical history: High cholesterol





- Social History


Smoking Status: Never Smoker


Substance Use Type: None





- Medications


Home Medications: 


                                Home Medications











 Medication  Instructions  Recorded  Confirmed  Last Taken  Type


 


Benzonatate [Tessalon Perles] 100 mg PO Q8HR PRN #30 capsule 19  Unknown 

Rx


 


Fluticasone [Flonase] 1 spray NS QDAY #1 bottle 19  Unknown Rx


 


guaiFENesin ER [Mucinex ER] 600 mg PO Q12H #14 tablet.er 19  Unknown Rx


 


Cyclobenzaprine [Flexeril] 10 mg PO QHS PRN #10 tablet 19  Unknown Rx


 


Meclizine [Antivert] 25 mg PO TID PRN #10 tablet 19  Unknown Rx


 


Pantoprazole [Protonix TAB] 40 mg PO QDAY #30 tablet 20  Unknown Rx


 


Ibuprofen [Motrin] 800 mg PO Q8HR PRN #30 tablet 20  Unknown Rx


 


traMADoL [Ultram 50 MG tab] 50 mg PO Q4HR PRN #14 tablet 20  Unknown Rx


 


Cyclobenzaprine [Flexeril] 10 mg PO TID PRN #10 tablet 20  Unknown Rx


 


HYDROcodone/APAP 5-325 [[place] 1 each PO Q6HR PRN #14 tablet 20  Unknown Rx





5/325]     


 


Ibuprofen [Motrin 800 MG tab] 800 mg PO Q8HR PRN #20 tablet 20  Unknown Rx


 


Azithromycin [Zithromax Z-ENRIQUETA] 250 mg PO DAILY #6 tablet 20  Unknown Rx


 


Cetirizine HCl [Zyrtec 10mg tab] 10 mg PO DAILY #30 tablet 20  Unknown Rx


 


Ibuprofen [Motrin] 800 mg PO Q8HR PRN #24 tablet 20  Unknown Rx


 


methylPREDNISolone [Medrol 4MG 4 mg PO DAILY #21 tab.ds.pk 20  Unknown Rx





DOSEPAK (21 tabs)]     














ED Physical Exam





- General


Limitations: No Limitations


General appearance: alert, in no apparent distress





- Head


Head exam: Present: atraumatic, normocephalic, normal inspection





- Eye


Eye exam: Present: normal appearance, PERRL, EOMI


Pupils: Present: normal accommodation





- ENT


ENT exam: Present: mucous membranes moist, TM's normal bilaterally, normal 

external ear exam, other (Palpable frontal sinus tenderness; grossly congested 

nasal passages; erythematous oropharynx)





- Neck


Neck exam: Present: normal inspection, full ROM.  Absent: tenderness, 

lymphadenopathy





- Respiratory


Respiratory exam: Present: normal lung sounds bilaterally.  Absent: respiratory 

distress, wheezes, rhonchi, chest wall tenderness, accessory muscle use, 

decreased breath sounds, prolonged expiratory





- Cardiovascular


Cardiovascular Exam: Present: regular rate, normal rhythm, normal heart sounds. 

Absent: systolic murmur, diastolic murmur, rubs, gallop





- GI/Abdominal


GI/Abdominal exam: Present: soft, normal bowel sounds.  Absent: tenderness, 

guarding, hyperactive bowel sounds, hypoactive bowel sounds





- Extremities Exam


Extremities exam: Present: normal inspection, full ROM, normal capillary refill





- Back Exam


Back exam: Present: normal inspection, full ROM.  Absent: CVA tenderness (L), 

muscle spasm, vertebral tenderness





- Neurological Exam


Neurological exam: Present: alert, oriented X3, CN II-XII intact, normal gait, 

reflexes normal





- Psychiatric


Psychiatric exam: Present: normal affect, normal mood





- Skin


Skin exam: Present: warm, dry, intact, normal color.  Absent: rash





ED Medical Decision Making





- Radiology Data


Radiology results: report reviewed, image reviewed





Findings





[facility] 


[address] [place] [address] 





XRay Report 


Signed 





 Patient: HOWARD ORTIZ MR 


 #: [id number] 


 : 1990 Acct:[id number] 





 Age/Sex: 29 / F ADM Date: 20 





 Loc: ED 


 Attending Dr: 








 Ordering Physician: YARIEL ABDULLAHI MD 


 Date of Service: 20 


 Procedure(s): XR chest 1V ap 


 Accession Number(s): [id number] 





 cc: YARIEL ABDULLAHI MD 





 Fluoro Time In Minutes: 





 CHEST 1 VIEW 





INDICATION: 


Chest Pain. 





COMPARISON: 


2020. 





FINDINGS: 


Support devices: None. 





Heart: Within normal limits. 


Lungs/Pleura: No acute air space or interstitial disease. 





Additional findings: None. 





IMPRESSION: No acute abnormality. 





Signer Name: Vimal Saez MD 


Signed: 9/3/2020 12:23 AM 


Workstation Name: [id number] 








 Transcribed By: ES 


 Dictated By: Vimal Saez MD 


 Electronically Authenticated By: Vimal Saez MD 


 Signed Date/Time: 20 0023 











 DD/DT: 20 


 TD/TT: 





- Medical Decision Making





This is a 29-year-old -American female with past medical history of GERD 

who presents to the ED with complaint of acute onset persistent frontal sinus 

pressure and headache, nasal and sinus congestion, sore throat, mild dry cough 

and pleuritic chest pain for the last 1 week, worse in the last 2 days.  Patient

states that she has been taking over-the-counter medications with no relief.  

Patient also states that she lost her voice about 2 days ago due to persistent 

sore throat.  In the ED, patient is alert and oriented x3 and is not in 

distress.  Chest x-ray shows no acute cardiopulmonary abnormalities or 

pneumonitis.  Patient was treated in the ED with oral steroid and pain 

medications.  On reevaluation, patient felt better and was discharged home on 

medications and advised follow-up with her primary care physician in 5 to 7 days

for reevaluation or return to the ED immediately if symptoms get worse.





- Differential Diagnosis


sinusitis; URI; Pharyngitis; Pneumonia; Bronchitis


Critical care attestation.: 


If time is entered above; I have spent that time in minutes in the direct care 

of this critically ill patient, excluding procedure time.








ED Disposition


Clinical Impression: 


 Acute upper respiratory infection





Acute frontal sinusitis


Qualifiers:


 Recurrence: non-recurrent Qualified Code(s): J01.10 - Acute frontal sinusitis, 

unspecified





Acute pharyngitis


Qualifiers:


 Pharyngitis/tonsillitis etiology: unspecified etiology Qualified Code(s): J02.9

- Acute pharyngitis, unspecified





Disposition: - TO HOME OR SELFCARE


Is pt being admited?: No


Does the pt Need Aspirin: No


Condition: Stable


Instructions:  Pharyngitis (ED), Acute Bacterial Rhinosinusitis (ED), Upper 

Respiratory Infection (ED)


Additional Instructions: 


Take medication with food, drink plenty of fluids and follow-up with your 

primary care physician in 5 to 7 days for reevaluation.  Return to the ED 

immediately if symptoms get worse.


Prescriptions: 


methylPREDNISolone [Medrol 4MG DOSEPAK (21 tabs)] 4 mg PO DAILY #21 tab.ds.pk


Ibuprofen [Motrin] 800 mg PO Q8HR PRN #24 tablet


 PRN Reason: Pain , Severe (7-10)


Azithromycin [Zithromax Z-ENRIQUETA] 250 mg PO DAILY #6 tablet


Cetirizine HCl [Zyrtec 10mg tab] 10 mg PO DAILY #30 tablet


Referrals: 


[facility] [Provider Group] - 3-5 Days


Forms:  Work/School Release Form(ED)


Time of Disposition: 04:27


Print Language: ENGLISH

## 2020-09-03 NOTE — XRAY REPORT
CHEST 1 VIEW 



INDICATION: 

Chest Pain.



COMPARISON: 

1/4/2020.



FINDINGS:

Support devices: None.



Heart: Within normal limits. 

Lungs/Pleura: No acute air space or interstitial disease. 



Additional findings: None.



IMPRESSION: No acute abnormality.



Signer Name: Vimal Saez MD 

Signed: 9/3/2020 12:23 AM

Workstation Name: [id number]

## 2020-10-05 ENCOUNTER — HOSPITAL ENCOUNTER (OUTPATIENT)
Dept: HOSPITAL 5 - MRI | Age: 30
Discharge: HOME | End: 2020-10-05
Attending: ORTHOPAEDIC SURGERY
Payer: COMMERCIAL
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
## 2020-10-05 DIAGNOSIS — S43.014A: Primary | ICD-10-CM
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
## 2020-10-05 NOTE — MAGNETIC RESONANCE REPORT
MRI right shoulder without contrast



INDICATION:  Anterior dislocation 3 times, generalized shoulder pain.



COMPARISON:  Right shoulder radiograph from 8/11/2020



FINDINGS:  There is a well-defined Hill-Sachs fracture along the superolateral humeral head which is 
most likely subacute/chronic given relative lack of surrounding bone marrow edema. No acute osseous a
bnormality or significant DJD identified.



Rotator cuff tendons are intact with no discrete tear identified. Likewise the biceps tendon is intac
t.



There is irregularity of the anterior inferior glenoid without acute-appearing tear (i.e. lack of flu
id bright signal), suggesting subacute/chronic Bankart lesion associated with a Hill-Sachs fracture.



IMPRESSION:  Findings of anterior inferior humeral head dislocation with associated labral injury. Gi
mirela relative lack of bone marrow edema and fluid bright signal in the labrum, these findings are like
ly subacute/chronic.



Signer Name: Toby Bueno MD 

Signed: 10/5/2020 9:11 AM

Workstation Name: [id number]

## 2020-10-14 ENCOUNTER — HOSPITAL ENCOUNTER (EMERGENCY)
Dept: HOSPITAL 5 - ED | Age: 30
Discharge: LEFT BEFORE DISCHARGE | End: 2020-10-14
Payer: COMMERCIAL

## 2020-10-14 VITALS — DIASTOLIC BLOOD PRESSURE: 93 MMHG | SYSTOLIC BLOOD PRESSURE: 134 MMHG

## 2020-10-14 DIAGNOSIS — R07.89: Primary | ICD-10-CM

## 2020-10-14 DIAGNOSIS — R00.2: ICD-10-CM

## 2020-10-14 DIAGNOSIS — Z53.21: ICD-10-CM

## 2020-10-14 DIAGNOSIS — R42: ICD-10-CM

## 2020-10-14 PROCEDURE — 93005 ELECTROCARDIOGRAM TRACING: CPT

## 2021-09-30 ENCOUNTER — HOSPITAL ENCOUNTER (EMERGENCY)
Dept: HOSPITAL 5 - ED | Age: 31
Discharge: HOME | End: 2021-09-30
Payer: COMMERCIAL

## 2021-09-30 VITALS — SYSTOLIC BLOOD PRESSURE: 156 MMHG | DIASTOLIC BLOOD PRESSURE: 82 MMHG

## 2021-09-30 DIAGNOSIS — E78.00: ICD-10-CM

## 2021-09-30 DIAGNOSIS — K21.9: ICD-10-CM

## 2021-09-30 DIAGNOSIS — M24.411: Primary | ICD-10-CM

## 2021-09-30 PROCEDURE — 23650 CLTX SHO DSLC W/MNPJ WO ANES: CPT

## 2021-09-30 PROCEDURE — 96374 THER/PROPH/DIAG INJ IV PUSH: CPT

## 2021-09-30 PROCEDURE — 73030 X-RAY EXAM OF SHOULDER: CPT

## 2021-09-30 PROCEDURE — 99283 EMERGENCY DEPT VISIT LOW MDM: CPT

## 2021-09-30 NOTE — XRAY REPORT
XR shoulder 2+V RT



INDICATION:

dislocation.



COMPARISON:

None available.



FINDINGS:

There is an anterior glenohumeral joint dislocation in the right shoulder.



Signer Name: Gabriel Lorenzo MD 

Signed: 9/30/2021 8:14 AM

Workstation Name: [id number]

## 2021-09-30 NOTE — EMERGENCY DEPARTMENT REPORT
Upper Extremity





- HPI


Stated Complaint: dislocated rt shoulder


Time Seen by Provider: 09/30/21 07:27


Other History: Patient presents secondary to right shoulder pain.  She was 

trying to put her son in a car seat to take him to  so she did go to 

work.  She felt her right shoulder pop out of joint.  She has dislocated her 

right shoulder before and states that it is "easy to do."  The last time she did

this, she had to be sedated for reduction.  She is complaining of pain in the 

right shoulder.  The pain does not radiate or migrate.  It is worse with any 

kind of movement.  She describes the pain is severe.  She denies any numbness or

tingling in the digits on the right hand.  She has not taken anything for pain 

prior to arrival.  She is right-hand dominant.  The pain is constant.





ED Review of Systems


ROS: 


Stated complaint: dislocated rt shoulder


Other details as noted in HPI





Comment: All other systems reviewed and negative


Constitutional: denies: fever


Eyes: denies: vision change


ENT: denies: throat pain


Respiratory: denies: cough


Cardiovascular: denies: chest pain


Endocrine: denies: unexplained weight loss


Gastrointestinal: denies: abdominal pain


Genitourinary: denies: dysuria


Musculoskeletal: denies: back pain


Skin: denies: rash


Neurological: denies: headache, numbness


Hematological/Lymphatic: denies: easy bruising





ED Past Medical Hx





- Past Medical History


Previous Medical History?: Yes


Hx GERD: Yes


Additional medical history: High cholesterol





- Surgical History


Past Surgical History?: No





- Family History


Family history: no significant





- Social History


Smoking Status: Never Smoker


Substance Use Type: None





- Medications


Home Medications: 


                                Home Medications











 Medication  Instructions  Recorded  Confirmed  Last Taken  Type


 


Fluticasone [Flonase] 1 spray NS QDAY #1 bottle 11/17/19  Unknown Rx


 


Cyclobenzaprine [Flexeril] 10 mg PO QHS PRN #10 tablet 12/19/19  Unknown Rx


 


Meclizine [Antivert] 25 mg PO TID PRN #10 tablet 12/19/19  Unknown Rx


 


Pantoprazole [Protonix TAB] 40 mg PO QDAY #30 tablet 01/04/20  Unknown Rx


 


traMADoL [Ultram 50 MG tab] 50 mg PO Q4HR PRN #14 tablet 04/25/20  Unknown Rx


 


Cyclobenzaprine [Flexeril] 10 mg PO TID PRN #10 tablet 08/11/20  Unknown Rx


 


HYDROcodone/APAP 5-325 [[place] 1 each PO Q6HR PRN #14 tablet 08/11/20  Unknown Rx





5/325]     


 


Cetirizine HCl [Zyrtec 10mg tab] 10 mg PO DAILY #30 tablet 09/03/20  Unknown Rx


 


methylPREDNISolone [Medrol 4MG 4 mg PO DAILY #21 tab.ds.pk 09/03/20  Unknown Rx





DOSEPAK (21 tabs)]     


 


Ibuprofen [Motrin 800 MG tab] 800 mg PO Q8HR PRN #30 tablet 09/30/21  Unknown Rx














Upper Extremity Exam





- Exam


General: 


Vital signs noted. No distress. Alert and acting appropriately.





Head and Torso: No HEENT Abnormality (Normocephalic, atraumatic.  PERRL.  EOMI. 

Oropharynx clear.), No Neck Tenderness (Trachea midline), No Chest/Lungs 

Abnormality (Heart is regular rate and rhythm.  Lungs are clear bilaterally.), 

No Abdominal Tenderness (Soft and nontender.), No Back Tenderness (No CVA 

tenderness.)


Shoulder Exam: Yes Shoulder Tenderness (Diffuse tenderness with palpation of the

right shoulder.  There is a suggestion of a step-off consistent with fracture.),

No Normal Range of Motion in Shoulder (Limited range of motion due to pain.)


Arm Exam: No Arm Deformity


Elbow: No Elbow Tenderness, No Normal Range of Motion in Elbow, No Elbow 

Deformity


Forearm: No Forearm Tenderness, No Forearm Deformity, No Pain with Pronation, No

Pain with Supination


Wrist: No Wrist Tenderness, No Normal ROM in Wrist


Hand: No Hand Tenderness, No Hand Deformity


CMS Exam: Yes Normal Distal Pulses (2+ radial pulses bilaterally), Yes Normal 

Capillary Refill (Normal capillary refill), Yes Normal Distal Sensation





ED Course


                                   Vital Signs











  09/30/21





  07:27


 


Temperature 98.4 F


 


Pulse Rate 64


 


Respiratory 16





Rate 


 


Blood Pressure 145/83





[Left] 


 


O2 Sat by Pulse 95





Oximetry 














- Reevaluation(s)


Reevaluation #1: 





09/30/21 07:30


IV medications were ordered.  X-rays were ordered.


Reevaluation #2: 





09/30/21 08:04


X-rays were reviewed.  Propofol was ordered.


Reevaluation #3: 





09/30/21 08:44


Shoulder was reduced.  X-rays are pending.





- Moderate Sedation


Indications: fracture/dislocation redu


Presedation Evaluation: 





Awake alert and in no distress.  Patient meets criteria for sedation


ASA Class: I


Mallampati Airway Score: 2


Time of Last PO Intake: 06:00


Preparation: cardiac monitor applied, pulse oximeter, capnometry used, 

supplemental O2 applied, suction/airway equipment at bedside, IV secured


IV Propofol Dose (mgs): 80 (This was divided into 50 mg and 30 mg dosing)


Complications: none


Patient Tolerated Procedure: well, no complications


Additional Comments: 





Intraservice sedation time was 17 minutes





- Orthopedic Joint Reduction


  ** Joint #1


Consent Obtained: verbal consent


Time Out Performed: Yes


Side: right


Joint Reduction Location: shoulder


Analgesia: moderate sedation


Shoulder Technique Used (if applicable): traction/counter-traction


Post-Reduction Neuro Exam: intact


Post-Reduction Vascular Exam: intact


Post Reduction X-Ray Obtained: Yes


Post Reduction X-Ray Results: reduced


Splint Applied: Yes (Shoulder immobilizer and sling)


Patient Tolerated Procedure: well


Additional Comments: 





Intraservice sedation time was 17 minutes.





ED Medical Decision Making





- Medical Decision Making





Patient present with a shoulder dislocation.  There was a Hill-Sachs deformity 

noted.  The shoulder was reduced.  Patient was subsequently discharged.  There 

was no evidence of neurologic compromise or vascular injury.  Patient had no o

ther joint injury or  instability.  I believe the Hill-Sachs fracture was old.


Critical Care Time: No


Critical care attestation.: 


If time is entered above; I have spent that time in minutes in the direct care 

of this critically ill patient, excluding procedure time.








ED Disposition


Clinical Impression: 


 Recurrent dislocation, right shoulder





Disposition: 01 HOME / SELF CARE / HOMELESS


Is pt being admited?: No


Condition: Stable


Instructions:  Shoulder Dislocation, Recurrent Shoulder Laxity and Instability


Additional Instructions: 


Wear the shoulder immobilizer.  Ice and elevate.  Return for problems.  Follow-

up with your orthopedic surgeon for recheck and further management.


Prescriptions: 


Ibuprofen [Motrin 800 MG tab] 800 mg PO Q8HR PRN #30 tablet


 PRN Reason: Pain , Severe (7-10)


Referrals: 


PRIMARY CARE,MD [Referring] - 3-5 Days


FATMATA JAIN MD [Staff Physician] - 3-5 Days


MATTEO GREENBERG MD [Staff Physician] - 3-5 Days


Forms:  Work/School Release Form(ED)

## 2021-09-30 NOTE — XRAY REPORT
XR shoulder 2+V RT



INDICATION / CLINICAL INFORMATION:

Post reduction



COMPARISON: 

Radiograph from earlier today.



FINDINGS/IMPRESSION:

The humeral head has been relocated with respect to the glenoid. Remote Hill-Sachs deformity again se
en. No acute fracture is identified.



Signer Name: Keo Farfan MD 

Signed: 9/30/2021 9:17 AM

Workstation Name: [id number]

## 2021-11-16 ENCOUNTER — HOSPITAL ENCOUNTER (EMERGENCY)
Dept: HOSPITAL 5 - ED | Age: 31
Discharge: LEFT BEFORE DISCHARGE | End: 2021-11-16
Payer: COMMERCIAL

## 2021-11-16 VITALS — DIASTOLIC BLOOD PRESSURE: 90 MMHG | SYSTOLIC BLOOD PRESSURE: 137 MMHG

## 2021-11-16 DIAGNOSIS — R11.0: ICD-10-CM

## 2021-11-16 DIAGNOSIS — R42: Primary | ICD-10-CM

## 2021-11-16 DIAGNOSIS — Z53.21: ICD-10-CM

## 2021-11-16 DIAGNOSIS — M79.602: ICD-10-CM

## 2022-03-29 ENCOUNTER — HOSPITAL ENCOUNTER (EMERGENCY)
Dept: HOSPITAL 5 - ED | Age: 32
Discharge: HOME | End: 2022-03-29
Payer: COMMERCIAL

## 2022-03-29 VITALS — DIASTOLIC BLOOD PRESSURE: 82 MMHG | SYSTOLIC BLOOD PRESSURE: 138 MMHG

## 2022-03-29 DIAGNOSIS — F41.9: Primary | ICD-10-CM

## 2022-03-29 DIAGNOSIS — K21.9: ICD-10-CM

## 2022-03-29 LAB
BILIRUB UR QL STRIP: (no result)
BLOOD UR QL VISUAL: (no result)
BUN SERPL-MCNC: 9 MG/DL (ref 7–17)
BUN/CREAT SERPL: 15 %
CALCIUM SERPL-MCNC: 9.9 MG/DL (ref 8.4–10.2)
HCT VFR BLD CALC: 39.5 % (ref 30.3–42.9)
HEMOLYSIS INDEX: 13
HGB BLD-MCNC: 13.2 GM/DL (ref 10.1–14.3)
MCHC RBC AUTO-ENTMCNC: 33 % (ref 30–34)
MCV RBC AUTO: 90 FL (ref 79–97)
PH UR STRIP: 6 [PH] (ref 5–7)
PLATELET # BLD: 222 K/MM3 (ref 140–440)
PROT UR STRIP-MCNC: (no result) MG/DL
RBC # BLD AUTO: 4.4 M/MM3 (ref 3.65–5.03)
RBC #/AREA URNS HPF: 4 /HPF (ref 0–6)
UROBILINOGEN UR-MCNC: < 2 MG/DL (ref ?–2)
WBC #/AREA URNS HPF: 1 /HPF (ref 0–6)

## 2022-03-29 PROCEDURE — 80048 BASIC METABOLIC PNL TOTAL CA: CPT

## 2022-03-29 PROCEDURE — 81001 URINALYSIS AUTO W/SCOPE: CPT

## 2022-03-29 PROCEDURE — 36415 COLL VENOUS BLD VENIPUNCTURE: CPT

## 2022-03-29 PROCEDURE — 81025 URINE PREGNANCY TEST: CPT

## 2022-03-29 PROCEDURE — 85027 COMPLETE CBC AUTOMATED: CPT

## 2022-03-29 PROCEDURE — 99283 EMERGENCY DEPT VISIT LOW MDM: CPT

## 2022-03-29 NOTE — EMERGENCY DEPARTMENT REPORT
ED General Adult HPI





- General


Chief complaint: Medical Clearance


Stated complaint: Anxiety


PUI?: No


Time Seen by Provider: 03/29/22 08:34


Source: patient


Mode of arrival: Ambulatory


Limitations: No Limitations





- History of Present Illness


Initial comments: 





Patient is a 31-year-old female that comes to the emergency room complaining of 

numbness and tingling.  She has a history of anxiety and reports being under a 

lot of stress with her kids at home and with her significant other.  She denies 

any chest pain or shortness of breath.  She does not want to take medicines for 

anxiety because she can get addicted to them.  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 

concerned that there is something medically wrong with her.  She is well-appe

aring and ambulatory, nontoxic non-ill-appearing on arrival to ER.


-: Gradual, days(s)


Improves with: none


Worsens with: other


Associated Symptoms: denies other symptoms





- Related Data


                                    Allergies











Allergy/AdvReac Type Severity Reaction Status Date / Time


 


No Known Allergies Allergy   Verified 11/16/21 13:41














ED Review of Systems


ROS: 


Stated complaint: LT SIDE NUMBNESS


Other details as noted in HPI





Comment: All other systems reviewed and negative





ED Past Medical Hx





- Past Medical History


Previous Medical History?: Yes


Hx GERD: Yes


Hx Psychiatric Treatment: Yes (anxiety)


Additional medical history: High cholesterol.  inc HR "when at doctor"





- Surgical History


Past Surgical History?: No





- Family History


Family history: no significant





- Social History


Smoking Status: Never Smoker


Substance Use Type: None





ED Physical Exam





- General


Limitations: No Limitations


General appearance: alert, in no apparent distress





- Head


Head exam: Present: atraumatic, normocephalic





- Eye


Eye exam: Present: normal appearance





- ENT


ENT exam: Present: mucous membranes moist





- Neck


Neck exam: Present: normal inspection





- Respiratory


Respiratory exam: Present: normal lung sounds bilaterally.  Absent: respiratory 

distress





- Cardiovascular


Cardiovascular Exam: Present: regular rate, normal rhythm.  Absent: systolic 

murmur, diastolic murmur, rubs, gallop





- GI/Abdominal


GI/Abdominal exam: Present: soft, normal bowel sounds





- Extremities Exam


Extremities exam: Present: normal inspection





- Back Exam


Back exam: Present: normal inspection





- Neurological Exam


Neurological exam: Present: alert, oriented X3





- Psychiatric


Psychiatric exam: Present: normal affect, normal mood





- Skin


Skin exam: Present: warm, dry, intact, normal color.  Absent: rash





ED Course


                                   Vital Signs











  03/29/22 03/29/22





  08:09 10:26


 


Temperature 98.0 F 


 


Pulse Rate 112 H 66


 


Respiratory 16 16





Rate  


 


Blood Pressure 144/85 


 


Blood Pressure  138/82





[Right]  


 


O2 Sat by Pulse 99 99





Oximetry  














ED Medical Decision Making





- Lab Data


Result diagrams: 


                                 03/29/22 09:31





                                 03/29/22 09:31





- Medical Decision Making








                                   Lab Results











  03/29/22 03/29/22 03/29/22 Range/Units





  09:31 09:31 Unknown 


 


WBC  5.0    (4.5-11.0)  K/mm3


 


RBC  4.40    (3.65-5.03)  M/mm3


 


Hgb  13.2    (10.1-14.3)  gm/dl


 


Hct  39.5    (30.3-42.9)  %


 


MCV  90    (79-97)  fl


 


MCH  30    (28-32)  pg


 


MCHC  33    (30-34)  %


 


RDW  12.9 L    (13.2-15.2)  %


 


Plt Count  222    (140-440)  K/mm3


 


Sodium   141   (137-145)  mmol/L


 


Potassium   4.1   (3.6-5.0)  mmol/L


 


Chloride   103.1   ()  mmol/L


 


Carbon Dioxide   27   (22-30)  mmol/L


 


Anion Gap   15   mmol/L


 


BUN   9   (7-17)  mg/dL


 


Creatinine   0.6   (0.6-1.2)  mg/dL


 


Estimated GFR   > 60   ml/min


 


BUN/Creatinine Ratio   15   %


 


Glucose   109 H   ()  mg/dL


 


Calcium   9.9   (8.4-10.2)  mg/dL


 


Urine Color    Yellow  (Yellow)  


 


Urine Turbidity    Clear  (Clear)  


 


Urine pH    6.0  (5.0-7.0)  


 


Ur Specific Gravity    1.005  (1.003-1.030)  


 


Urine Protein    <15 mg/dl  (Negative)  mg/dL


 


Urine Glucose (UA)    Neg  (Negative)  mg/dL


 


Urine Ketones    Neg  (Negative)  mg/dL


 


Urine Blood    Neg  (Negative)  


 


Urine Nitrite    Neg  (Negative)  


 


Ur Reducing Substances    Not Reportable  


 


Urine Bilirubin    Neg  (Negative)  


 


Urine Ictotest    Not Reportable  


 


Urine Urobilinogen    < 2.0  (<2.0)  mg/dL


 


Ur Leukocyte Esterase    Neg  (Negative)  


 


Urine WBC (Auto)    1.0  (0.0-6.0)  /HPF


 


Urine RBC (Auto)    4.0  (0.0-6.0)  /HPF


 


U Epithel Cells (Auto)    2.0  (0-13.0)  /HPF


 


Urine HCG, Qual    Negative  (Negative)  











                                   Vital Signs











  03/29/22 03/29/22





  08:09 10:26


 


Temperature 98.0 F 


 


Pulse Rate 112 H 66


 


Respiratory 16 16





Rate  


 


Blood Pressure 144/85 


 


Blood Pressure  138/82





[Right]  


 


O2 Sat by Pulse 99 99





Oximetry  








Labs noted.  UA noted.  U pregnant negative.





Patient has spent most of her time in the ER playing on her cell phone.  She is 

in no acute distress.





Patient was reassured of her lab findings.  Have encouraged her to follow-up 

with primary care and get established with someone so that they can manage her 

anxiety on ongoing basis.  She verbalizes understanding.





Patient being discharged home with discharge plan of care including diet, meds, 

activity and follow-up.  We have discussed ways to control her anxiety and 

minimize her stress.





- Differential Diagnosis


anxiety


Critical care attestation.: 


If time is entered above; I have spent that time in minutes in the direct care 

of this critically ill patient, excluding procedure time.








ED Disposition


Clinical Impression: 


 Anxiety





Disposition: 01 HOME / SELF CARE / HOMELESS


Is pt being admited?: No


Does the pt Need Aspirin: No


Condition: Stable


Instructions:  Managing Anxiety, Adult


Additional Instructions: 


stay well hydrated





daily exercise





reduce stress





follow up with pcp





Referrals: 


[facility] [Other] - 3-5 Days


Forms:  Work/School Release Form(ED)


Time of Disposition: 10:09

## 2022-06-23 ENCOUNTER — TELEPHONE ENCOUNTER (OUTPATIENT)
Dept: URBAN - METROPOLITAN AREA CLINIC 109 | Facility: CLINIC | Age: 32
End: 2022-06-23

## 2022-07-13 ENCOUNTER — HOSPITAL ENCOUNTER (EMERGENCY)
Dept: HOSPITAL 5 - ED | Age: 32
Discharge: LEFT BEFORE DISCHARGE | End: 2022-07-13
Payer: COMMERCIAL

## 2022-07-13 VITALS — DIASTOLIC BLOOD PRESSURE: 90 MMHG | SYSTOLIC BLOOD PRESSURE: 144 MMHG

## 2022-07-13 DIAGNOSIS — R42: Primary | ICD-10-CM

## 2022-07-13 DIAGNOSIS — Z53.21: ICD-10-CM

## 2022-09-02 ENCOUNTER — OFFICE VISIT (OUTPATIENT)
Dept: URBAN - METROPOLITAN AREA CLINIC 109 | Facility: CLINIC | Age: 32
End: 2022-09-02
Payer: COMMERCIAL

## 2022-09-02 ENCOUNTER — DASHBOARD ENCOUNTERS (OUTPATIENT)
Age: 32
End: 2022-09-02

## 2022-09-02 VITALS
BODY MASS INDEX: 40.47 KG/M2 | DIASTOLIC BLOOD PRESSURE: 87 MMHG | WEIGHT: 228.4 LBS | HEIGHT: 63 IN | SYSTOLIC BLOOD PRESSURE: 143 MMHG | TEMPERATURE: 97.9 F | HEART RATE: 64 BPM

## 2022-09-02 DIAGNOSIS — K21.9 GASTROESOPHAGEAL REFLUX DISEASE WITHOUT ESOPHAGITIS: ICD-10-CM

## 2022-09-02 DIAGNOSIS — R09.89 GLOBUS SENSATION: ICD-10-CM

## 2022-09-02 PROBLEM — 266435005: Status: ACTIVE | Noted: 2022-09-02

## 2022-09-02 PROCEDURE — 99213 OFFICE O/P EST LOW 20 MIN: CPT | Performed by: INTERNAL MEDICINE

## 2022-09-02 RX ORDER — BISMUTH SUBSALICYLATE 525 MG/30ML
LIQUID ORAL
Qty: 0 | Refills: 0 | Status: ACTIVE | COMMUNITY
Start: 1900-01-01

## 2022-09-02 NOTE — HPI-TODAY'S VISIT:
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

## 2022-09-04 ENCOUNTER — HOSPITAL ENCOUNTER (EMERGENCY)
Dept: HOSPITAL 5 - ED | Age: 32
Discharge: HOME | End: 2022-09-04
Payer: COMMERCIAL

## 2022-09-04 VITALS — SYSTOLIC BLOOD PRESSURE: 115 MMHG | DIASTOLIC BLOOD PRESSURE: 69 MMHG

## 2022-09-04 DIAGNOSIS — R20.2: ICD-10-CM

## 2022-09-04 DIAGNOSIS — K21.9: ICD-10-CM

## 2022-09-04 DIAGNOSIS — F41.9: Primary | ICD-10-CM

## 2022-09-04 LAB
ALBUMIN SERPL-MCNC: 4.6 G/DL (ref 3.9–5)
ALT SERPL-CCNC: 9 UNITS/L (ref 7–56)
APTT BLD: 26.7 SEC. (ref 24.2–36.6)
BASOPHILS # (AUTO): 0 K/MM3 (ref 0–0.1)
BASOPHILS NFR BLD AUTO: 0.4 % (ref 0–1.8)
BUN SERPL-MCNC: 7 MG/DL (ref 7–17)
BUN/CREAT SERPL: 10 %
CALCIUM SERPL-MCNC: 9.7 MG/DL (ref 8.4–10.2)
EOSINOPHIL # BLD AUTO: 0 K/MM3 (ref 0–0.4)
EOSINOPHIL NFR BLD AUTO: 0.1 % (ref 0–4.3)
HCT VFR BLD CALC: 41.5 % (ref 30.3–42.9)
HEMOLYSIS INDEX: 8
HGB BLD-MCNC: 14.1 GM/DL (ref 10.1–14.3)
INR PPP: 0.98 (ref 0.87–1.13)
LYMPHOCYTES # BLD AUTO: 2.3 K/MM3 (ref 1.2–5.4)
LYMPHOCYTES NFR BLD AUTO: 37.1 % (ref 13.4–35)
MCHC RBC AUTO-ENTMCNC: 34 % (ref 30–34)
MCV RBC AUTO: 89 FL (ref 79–97)
MONOCYTES # (AUTO): 0.3 K/MM3 (ref 0–0.8)
MONOCYTES % (AUTO): 5.6 % (ref 0–7.3)
PLATELET # BLD: 275 K/MM3 (ref 140–440)
RBC # BLD AUTO: 4.67 M/MM3 (ref 3.65–5.03)
T4 FREE SERPL-MCNC: 1.11 NG/DL (ref 0.76–1.46)

## 2022-09-04 PROCEDURE — 36415 COLL VENOUS BLD VENIPUNCTURE: CPT

## 2022-09-04 PROCEDURE — G0480 DRUG TEST DEF 1-7 CLASSES: HCPCS

## 2022-09-04 PROCEDURE — 85670 THROMBIN TIME PLASMA: CPT

## 2022-09-04 PROCEDURE — 85610 PROTHROMBIN TIME: CPT

## 2022-09-04 PROCEDURE — 82550 ASSAY OF CK (CPK): CPT

## 2022-09-04 PROCEDURE — 85730 THROMBOPLASTIN TIME PARTIAL: CPT

## 2022-09-04 PROCEDURE — 80053 COMPREHEN METABOLIC PANEL: CPT

## 2022-09-04 PROCEDURE — 99283 EMERGENCY DEPT VISIT LOW MDM: CPT

## 2022-09-04 PROCEDURE — 83735 ASSAY OF MAGNESIUM: CPT

## 2022-09-04 PROCEDURE — 84484 ASSAY OF TROPONIN QUANT: CPT

## 2022-09-04 PROCEDURE — 80320 DRUG SCREEN QUANTALCOHOLS: CPT

## 2022-09-04 PROCEDURE — 84439 ASSAY OF FREE THYROXINE: CPT

## 2022-09-04 PROCEDURE — 84702 CHORIONIC GONADOTROPIN TEST: CPT

## 2022-09-04 PROCEDURE — 82553 CREATINE MB FRACTION: CPT

## 2022-09-04 PROCEDURE — 93005 ELECTROCARDIOGRAM TRACING: CPT

## 2022-09-04 PROCEDURE — 85025 COMPLETE CBC W/AUTO DIFF WBC: CPT

## 2022-09-04 PROCEDURE — 84443 ASSAY THYROID STIM HORMONE: CPT

## 2022-09-04 NOTE — EMERGENCY DEPARTMENT REPORT
ED Neuro Deficit HPI





- General


Chief Complaint: Neuro Symptoms/Deficit


Stated Complaint: TINGLING IN FACE,LEFT SIDE CHEST PAIN, LIGHTHEADED


Time Seen by Provider: 09/04/22 22:14


Source: patient, old records reviewed


Mode of arrival: Ambulatory


Limitations: No Limitations





- History of Present Illness


Initial Comments: 





31-year-old female the past medical history of anxiety, GERD, and elevated 

cholesterol presents to the hospital complaining of intermittent tingling 

sensation/paresthesias since yesterday.   Patient complains of intermittent 

paresthesias to her whole face, her left arm, left flank, left leg, right leg, 

and bilateral hands.  She began to feel the tingling and burning sensation 

radiating to her chest as well patient has a history of anxiety with similar 

symptoms which typically also include palpitations and shortness of breath which

she currently denies. Patient has also been seen here in the past for similar 

symptoms secondary to anxiety.  Patient does endorse a lot of stress.  She is a 

single mother of 3 children and does not have a lot of family support.  She also

works and recently started school and is taking her core classes prior to being 

excepted to nursing school.  Patient had a unpleasant conversation with one of h

 kids grandmothers which she believes exacerbated her symptoms. 





- Related Data


Home Medications: 


                                  Previous Rx's











 Medication  Instructions  Recorded  Last Taken  Type


 


Famotidine/Ca Carb/Mag Hydrox 1 each PO BID PRN #20 tab 09/04/22 Unknown Rx





[Pepcid Complete Tablet Chew]    


 


hydrOXYzine PAMOATE [Vistaril] 50 mg PO Q6HR PRN #20 capsule 09/04/22 Unknown Rx











Allergies/Adverse Reactions: 


                                    Allergies











Allergy/AdvReac Type Severity Reaction Status Date / Time


 


No Known Allergies Allergy   Verified 11/16/21 13:41














ED Review of Systems


ROS: 


Stated complaint: TINGLING IN FACE,LEFT SIDE CHEST PAIN, LIGHTHEADED


Other details as noted in HPI





Comment: All other systems reviewed and negative





ED Past Medical Hx





- Past Medical History


Hx GERD: Yes


Hx Psychiatric Treatment: Yes (anxiety)


Additional medical history: High cholesterol.  inc HR "when at doctor".  

Recurrent right shoulder dislocation





- Social History


Smoking Status: Never Smoker


Substance Use Type: None





- Medications


Home Medications: 


                                Home Medications











 Medication  Instructions  Recorded  Confirmed  Last Taken  Type


 


Famotidine/Ca Carb/Mag Hydrox 1 each PO BID PRN #20 tab 09/04/22  Unknown Rx





[Pepcid Complete Tablet Chew]     


 


hydrOXYzine PAMOATE [Vistaril] 50 mg PO Q6HR PRN #20 capsule 09/04/22  Unknown 

Rx














ED Neuro Physical Exam





- General


Limitations: No Limitations


Suspected Stroke: No





- NIHSS


Assessment Interval: Baseline


1a. Level of Consciousness: alert/keenly responsive


1b. LOC Questions: answers both correctly


1c. LOC Commands: performs tasks correctly


2. Best Gaze: normal


3. Visual: no visual loss


4. Facial Palsy: normal symmetrical movement


5b. Motor Arm Right: no drift


5a. Motor Arm Left: no drift


6a. Motor Leg Left: no drift


6b. Motor Leg Right: no drift


7. Limb Ataxia: absent


8. Sensory: normal


9. Best Language: no aphasia


10. Dysarthria: normal


11. Extinction/Inattention: no abnormality


Total Score: 0


Stroke Severity: No Stroke Symptoms





- Other


Other exam information: 





General: No acute distress


Head: Atraumatic


Eyes: normal appearance


ENT: Moist mucous membranes


Neck: Normal appearance, no midline tenderness


Chest: Clear to auscultation bilaterally


CV: Regular rate and rhythm


Abdomen: Soft, normal bowel sounds, nontender, nondistended, no rebound or 

guarding


Back: Normal inspection


Extremity: Normal inspection, full range of motion


Neuro: Alert O x 3, no facial asymmetry, speech clear, no gross motor sensory 

deficit, see NIH stroke


Psych: Appropriate behavior


Skin: No rash





ED Course


                                   Vital Signs











  09/04/22 09/04/22 09/04/22





  16:39 22:27 22:31


 


Temperature 98.7 F  


 


Pulse Rate 113 H 77 68


 


Respiratory 20 15 18





Rate   


 


Blood Pressure   113/71


 


Blood Pressure 164/83  





[Right]   


 


O2 Sat by Pulse 100 100 100





Oximetry   














  09/04/22 09/04/22 09/04/22





  22:45 23:01 23:20


 


Temperature   


 


Pulse Rate 88 67 75


 


Respiratory 13 19 18





Rate   


 


Blood Pressure 136/87 136/87 


 


Blood Pressure   





[Right]   


 


O2 Sat by Pulse 100 100 97





Oximetry   














- Lab Data


Result diagrams: 


                                 09/04/22 22:21





                                 09/04/22 22:21


                                   Lab Results











  09/04/22 09/04/22 09/04/22 Range/Units





  22:21 22:21 22:21 


 


WBC  6.1    (4.5-11.0)  K/mm3


 


RBC  4.67    (3.65-5.03)  M/mm3


 


Hgb  14.1    (10.1-14.3)  gm/dl


 


Hct  41.5    (30.3-42.9)  %


 


MCV  89    (79-97)  fl


 


MCH  30    (28-32)  pg


 


MCHC  34    (30-34)  %


 


RDW  13.0 L    (13.2-15.2)  %


 


Plt Count  275    (140-440)  K/mm3


 


Lymph % (Auto)  37.1 H    (13.4-35.0)  %


 


Mono % (Auto)  5.6    (0.0-7.3)  %


 


Eos % (Auto)  0.1    (0.0-4.3)  %


 


Baso % (Auto)  0.4    (0.0-1.8)  %


 


Lymph # (Auto)  2.3    (1.2-5.4)  K/mm3


 


Mono # (Auto)  0.3    (0.0-0.8)  K/mm3


 


Eos # (Auto)  0.0    (0.0-0.4)  K/mm3


 


Baso # (Auto)  0.0    (0.0-0.1)  K/mm3


 


Seg Neutrophils %  56.8    (40.0-70.0)  %


 


Seg Neutrophils #  3.5    (1.8-7.7)  K/mm3


 


PT   14.1   (12.2-14.9)  Sec.


 


INR   0.98   (0.87-1.13)  


 


APTT   26.7   (24.2-36.6)  Sec.


 


Thrombin Time   15.7   (15.1-19.6)  Sec.


 


Sodium    141  (137-145)  mmol/L


 


Potassium    4.2  (3.6-5.0)  mmol/L


 


Chloride    102.7  ()  mmol/L


 


Carbon Dioxide    26  (22-30)  mmol/L


 


Anion Gap    17  mmol/L


 


BUN    7  (7-17)  mg/dL


 


Creatinine    0.7  (0.6-1.2)  mg/dL


 


Estimated GFR    > 60  ml/min


 


BUN/Creatinine Ratio    10  %


 


Glucose    90  ()  mg/dL


 


Calcium    9.7  (8.4-10.2)  mg/dL


 


Magnesium    1.90  (1.7-2.3)  mg/dL


 


Total Bilirubin    0.50  (0.1-1.2)  mg/dL


 


AST    13  (5-40)  units/L


 


ALT    9  (7-56)  units/L


 


Alkaline Phosphatase    85  ()  units/L


 


Total Creatine Kinase    126  ()  units/L


 


CK-MB (CK-2)    1.3  (0.0-4.0)  ng/mL


 


CK-MB (CK-2) Rel Index    1.0  (0-4)  


 


Troponin T    < 0.010  (0.00-0.029)  ng/mL


 


Total Protein    7.2  (6.3-8.2)  g/dL


 


Albumin    4.6  (3.9-5)  g/dL


 


Albumin/Globulin Ratio    1.8  %


 


TSH     (0.270-4.200)  mlU/mL


 


Free T4     (0.76-1.46)  ng/dL


 


HCG, Quant     (0-4)  mIU/mL


 


Plasma/Serum Alcohol     (0-0.07)  %














  09/04/22 09/04/22 09/04/22 Range/Units





  22:21 22:21 22:21 


 


WBC     (4.5-11.0)  K/mm3


 


RBC     (3.65-5.03)  M/mm3


 


Hgb     (10.1-14.3)  gm/dl


 


Hct     (30.3-42.9)  %


 


MCV     (79-97)  fl


 


MCH     (28-32)  pg


 


MCHC     (30-34)  %


 


RDW     (13.2-15.2)  %


 


Plt Count     (140-440)  K/mm3


 


Lymph % (Auto)     (13.4-35.0)  %


 


Mono % (Auto)     (0.0-7.3)  %


 


Eos % (Auto)     (0.0-4.3)  %


 


Baso % (Auto)     (0.0-1.8)  %


 


Lymph # (Auto)     (1.2-5.4)  K/mm3


 


Mono # (Auto)     (0.0-0.8)  K/mm3


 


Eos # (Auto)     (0.0-0.4)  K/mm3


 


Baso # (Auto)     (0.0-0.1)  K/mm3


 


Seg Neutrophils %     (40.0-70.0)  %


 


Seg Neutrophils #     (1.8-7.7)  K/mm3


 


PT     (12.2-14.9)  Sec.


 


INR     (0.87-1.13)  


 


APTT     (24.2-36.6)  Sec.


 


Thrombin Time     (15.1-19.6)  Sec.


 


Sodium     (137-145)  mmol/L


 


Potassium     (3.6-5.0)  mmol/L


 


Chloride     ()  mmol/L


 


Carbon Dioxide     (22-30)  mmol/L


 


Anion Gap     mmol/L


 


BUN     (7-17)  mg/dL


 


Creatinine     (0.6-1.2)  mg/dL


 


Estimated GFR     ml/min


 


BUN/Creatinine Ratio     %


 


Glucose     ()  mg/dL


 


Calcium     (8.4-10.2)  mg/dL


 


Magnesium     (1.7-2.3)  mg/dL


 


Total Bilirubin     (0.1-1.2)  mg/dL


 


AST     (5-40)  units/L


 


ALT     (7-56)  units/L


 


Alkaline Phosphatase     ()  units/L


 


Total Creatine Kinase     ()  units/L


 


CK-MB (CK-2)     (0.0-4.0)  ng/mL


 


CK-MB (CK-2) Rel Index     (0-4)  


 


Troponin T     (0.00-0.029)  ng/mL


 


Total Protein     (6.3-8.2)  g/dL


 


Albumin     (3.9-5)  g/dL


 


Albumin/Globulin Ratio     %


 


TSH  0.667  0.669   (0.270-4.200)  mlU/mL


 


Free T4   1.11   (0.76-1.46)  ng/dL


 


HCG, Quant   < 2   (0-4)  mIU/mL


 


Plasma/Serum Alcohol    < 0.01  (0-0.07)  %














- EKG Data


-: EKG Interpreted by Me


EKG shows normal: sinus rhythm, ST-T waves (Nonspecific T wave abnormality in 

the anterior leads, no STEMI)


Rate: normal





- Medical Decision Making





31-year-old female presents to the hospital symptoms and signs of anxiety.  

Neurologic exam is nonfocal.  Labs unremarkable do not reveal electrolyte 

abnormality or thyroid disorder.  EKG is normal sinus rate 76.  Patient did 

initially present with tachycardia which improved spontaneously without 

intervention.  Patient admits to increased stress to her life but has not seen 

her therapist in several years.  She was encouraged to reconnect with her 

therapist, follow-up with a psychiatrist, and will be provided Vistaril as 

needed anxiety symptoms.  Patient also endorses a burning sensation in her chest

 with a history of GERD as per medical record.  GERD may be exacerbated by 

anxiety and stress as well.  H2 blocker will be prescribed


Critical Care Time: No


Critical care attestation.: 


If time is entered above; I have spent that time in minutes in the direct care 

of this critically ill patient, excluding procedure time.








ED Disposition


Clinical Impression: 


 Anxiety, Paresthesia, GERD (gastroesophageal reflux disease)





Disposition: 01 HOME / SELF CARE / HOMELESS


Is pt being admited?: No


Does the pt Need Aspirin: No


Condition: Stable


Instructions:  Managing Anxiety, Adult, Gastroesophageal Reflux Disease, Adult, 

Easy-to-Read


Additional Instructions: 


Take the medication as prescribed.  Follow-up with your doctor or doctor/clinic 

provided.  Reconnect with your therapist for management of your anxiety 

symptoms.  Return if symptoms worsen as indicated by your discharge 

instructions.





Professional and Agency Contacts To help Resolve Crises (24/7)





[place] Crisis Line: 1-825.806.4212


Suicide Prevention Line: 1-631.124.2251


Crisis Text Line: Text ``START to 498560


Emergency: 911





Outpatient [facility] Behavioral Health Resources:


[place]:


[facility]


[address], [place] [address]


Phone: [phone] 





[place]: [facility]


[address] [place] [address] 


Phone: [phone]


Monday thru Friday - 8am - 5pm


**Call to schedule an assessment for mental health and substance abuse 

programs***





[facility]


Address: [address] [place] [address]


Monday thru Friday- 7am-2pm


Phone: [phone]





[facility]


Address: [address] [place] [address]


Monday thru Friday: 8:30AM-5PM


Phone: [phone]





Prescriptions: 


Famotidine/Ca Carb/Mag Hydrox [Pepcid Complete Tablet Chew] 1 each PO BID PRN 

#20 tab


 PRN Reason: Indigestion


hydrOXYzine PAMOATE [Vistaril] 50 mg PO Q6HR PRN #20 capsule


 PRN Reason: Anxiety


Referrals: 


[facility] [Primary Care Provider] - 3-5 Days


[facility] [Outside] - 3-5 Days


Time of Disposition: 23:44

## 2022-09-04 NOTE — EVENT NOTE
Date: 09/04/22





The patient was evaluated in the emergency department for symptoms described in 

the history of present illness.  He/she was evaluated in the context of the 

global COVID-19 pandemic, which necessitated consideration that the patient 

might be at risk for infection with the virus that causes COVID-19.  

Institutional protocols and algorithms that pertain to the evaluation of 

patients at risk for COVID-19 are in a state of rapid change based on 

information released by regulatory bodies including the CDC and federal and 

state organizations.  These policies and algorithms were followed during the 

patient's care in the emergency department.  Please note that these policies, 

procedures and recommendations changed on a rapid basis.








Medical screening examination note:





31-year-old female who states that she is not pregnant, who has received 1 

Pfizer COVID-19 vaccination, presenting to the department today with a complaint

of nonspecific chest pressure, bilateral facial paresthesias, bilateral hand 

paresthesias, and bilateral foot and ankle paresthesias.





She is moving 4 extremities.  There is no facial droop.  The tongue is midline. 

EOMI.  5 out of 5 strength in 4 extremities.  Patient manipulating cell phone 

without difficulty.





She is clinically sober with a GCS of 15.





She states that she is not pregnant, and denies bladder or bowel retention 

incontinence or saddle anesthesia.





Obtain EKG, appropriate laboratory studies, detailed history and physical to be 

performed by oncoming provider.


                                   Vital Signs











  09/04/22





  16:39


 


Temperature 98.7 F


 


Pulse Rate 113 H


 


Respiratory 20





Rate 


 


Blood Pressure 164/83





[Right] 


 


O2 Sat by Pulse 100





Oximetry

## 2022-09-05 NOTE — ELECTROCARDIOGRAPH REPORT
[facility]

                                       

Test Date:    2022               Test Time:    22:36:29

Pat Name:     HOWARD ORTIZ          Department:   

Patient ID:   [id number]          Room:          

Gender:       F                        Technician:   VARUN

:          1990               Requested By: RODY WARE

Order Number: [id number]             Reading MD:   Melissa Gaytan

                                 Measurements

Intervals                              Axis          

Rate:         76                       P:            66

IA:           154                      QRS:          8

QRSD:         94                       T:            33

QT:           373                                    

QTc:          419                                    

                           Interpretive Statements

Sinus rhythm

Low voltage, precordial leads

Nonspecific T abnormalities, anterior leads

No previous ECG available for comparison

Electronically Signed On 2022 16:59:06 EDT by Melissa Gaytan